# Patient Record
(demographics unavailable — no encounter records)

---

## 2019-06-29 NOTE — XMS REPORT
Clinical Summary

                             Created on: 2019



Dat Sy

External Reference #: [id number]

: 1953

Sex: Female



Demographics







                          Address                   [address] [place] [address]  [address]

 

                          Home Phone                [phone]

 

                          Preferred Language        English

 

                          Marital Status            Unknown

 

                          Alevism Affiliation     Roman Catholic

 

                          Race                      White

 

                          Ethnic Group              Non-





Author







                          Author                    [facility]

 

                          Organization              [facility]

 

                          Address                   Unknown

 

                          Phone                     Unavailable







Support







                Name            Relationship    Address         Phone

 

                    Dat Sy    [employer]                [address] [place]  [address]                 [phone]







Care Team Providers







                    Care Team Member Name    Role                Phone

 

                    Elan Patel     PCP                 Unavailable







Allergies







                                        Comments



                 Active Allergy     Reactions       Severity        Noted Date 

 

                                         



                 Sulfamethoxazole-Trimetho     Itching         High            2015 



                                         prim    

 

                                         



                 Budesonide-Formoterol     Nausea Only     High            2015 







Medications







                          End Date                  Status



              Medication     Sig          Dispensed     Refills      Start  



                                         Date  

 

                                                    Active



                     atorvastatin (LIPITOR) 80     Take 40 mg by       0   



                           MG tablet                 mouth daily .     

 

                                                    Active



                     pantoprazole (PROTONIX)     Take 40 mg by       0   



                           40 MG tablet              mouth daily.     

 

                                                    Active



                     phenytoin (DILANTIN) 100     Take 100 mg         0   



                           MG ER capsuleIndications:     by mouth as     



                           per pt and ,       directed 100     



                                         mg every     



                                         night; On     



                                         Tuesday,     



                                         Thursday,     



                                         Saturday,     



                                          100 mg     



                                         in the day     



                                         time added.     

 

                                                    Active



                     solifenacin (VESICARE) 5     Take 5 mg by        0   



                           MG tablet                 mouth daily.     

 

                                                    Active



                     tiotropium (SPIRIVA) 18     Inhale 18 mcg       0   



                           mcg inhalation capsule     by mouth via     



                                         inhaler     



                                         daily.     

 

                                                    Active



                     albuterol HFA (VENTOLIN     Inhale 1 puff       0   



                           HFA) 90 mcg/actuation     by mouth via     



                           inhaler                   inhaler every     



                                         6 (six) hours     



                                         as needed for     



                                         Wheezing.     

 

                                                    Active



                     zonisamide (ZONEGRAN) 100     Take 100 mg         0   



                           MG capsule                by mouth 2     



                                         (two) times     



                                         daily .     

 

                                                    Active



                     pregabalin (LYRICA) 50 MG     Take 50 mg by       0   



                           capsule                   mouth 2 (two)     



                                         times daily .     

 

                                                    Active



                     ranitidine (ZANTAC) 150     Take 150 mg         0   



                           MG tablet                 by mouth 2     



                                         (two) times     



                                         daily.     

 

                                                    Active



                     calcium citrate-vitamin     Take 600 mg         0   



                           D3 250 mg calcium- 200     by mouth.     



                                         unit Tab      

 

                                                    Active



                 DULERA 200-5     2 puffs 2       0               02/18/201  



                     mcg/actuation inhaler     (two) times         9  



                                         daily .     

 

                                                    Active



                     fluticasone (FLONASE) 50       0                     



                           mcg/actuation nasal spray        8  

 

                                                    Active



                 clobetasol (TEMOVATE)     AP AA ON        0                 



                     0.05 % cream        RIGHT ARM QD        8  



                                         FOR 2 WEEKS     



                                         CYCLES     

 

                          2020                Active



              furosemide (LASIX) 20 MG     Take 1 tablet     30 tablet     0              



                     tablet              (20 mg total)       9  



                                         by mouth     



                                         daily.     

 

                          2020                Active



              metoprolol (LOPRESSOR)     Take 1 tablet     60 tablet     0              



                     100 MG tablet       (100 mg             9  



                                         total) by     



                                         mouth 2 (two)     



                                         times daily.     

 

                                                    Active



                     oxyCODONE-acetaminophen     Take 1 tablet       0   



                           (PERCOCET)  mg per     by mouth     



                           tablet                    every 6 (six)     



                                         hours as     



                                         needed for     



                                         Pain.     

 

                          2019                Active



              methocarbamol (ROBAXIN)     Take 1 tablet     30 tablet     0              



                     500 MG tablet       (500 mg             9  



                                         total) by     



                                         mouth 3     



                                         (three) times     



                                         daily as     



                                         needed     



                                         (muscle     



                                         spasms) for     



                                         up to 10     



                                         days.     

 

                          2019                Discontinued



                     aspirin 81 MG EC tablet     Take 81 mg by       0   



                                         mouth daily.     

 

                          2019                Discontinued



                     clopidogrel (PLAVIX) 75     Take 75 mg by       0   



                           mg tablet                 mouth daily.     

 

                          2019                Discontinued



                     diazepam (VALIUM) 10 MG     Take 10 mg by       0   



                           tablet                    mouth every     



                                         12 (twelve)     



                                         hours as     



                                         needed for     



                                         Anxiety.     

 

                          06/10/2019                Discontinued



                     HYDROcodone-acetaminophen     Take 1 tablet       0   



                           (NORCO 7.5-325) 7.5-325     by mouth     



                           mg per tablet             every 6 (six)     



                                         hours as     



                                         needed for     



                                         Pain.     

 

                          2019                Discontinued



                     carvedilol (COREG) 6.25     Take 6.25 mg        0   



                           MG tablet                 by mouth 2     



                                         (two) times     



                                         daily with     



                                         breakfast and     



                                         dinner.     

 

                          2019                Discontinued



                     mometasone-formoterol     Inhale 2            0   



                           (DULERA) 100-5            puffs by     



                           mcg/actuation inhaler     mouth via     



                                         inhaler 2     



                                         (two) times     



                                         daily.     

 

                          06/10/2019                Discontinued



                     LORazepam (ATIVAN) 0.5 MG     Take 0.5 mg         0   



                           tablet                    by mouth     



                                         every 6 (six)     



                                         hours as     



                                         needed for     



                                         Anxiety.     

 

                          2019                Discontinued



                     LORazepam (ATIVAN) 0.5 MG       0                     



                           tablet                    8  

 

                          2019                Discontinued



                     PROTONIX 40 mg tablet       0                     



                                         9  

 

                          2019                Discontinued



                     ZANTAC 150 mg tablet       0                     



                                         8  

 

                          2019                Discontinued



                     solifenacin (VESICARE) 5       0                     



                           MG tablet                 9  

 

                          2019                Discontinued



                     SPIRIVA WITH HANDIHALER       0                   02/15/201  



                           18 mcg inhalation capsule        9  

 

                          2019                Discontinued



                     zonisamide (ZONEGRAN) 100       0                     



                           MG capsule                9  

 

                          2019                Discontinued



                     aspirin-calcium carbonate     Take by             0   



                           81 mg-300 mg calcium(777     mouth.     



                                         mg) Tab      

 

                          2019                



              predniSONE (DELTASONE) 20     Take 2       40 tablet     0              



                     MG tablet           tablets (40         9  



                                         mg total) by     



                                         mouth 2 (two)     



                                         times daily     



                                         for 10 days.     

 

                          2019                



              rivaroxaban (XARELTO) 15     Take 1 tablet     38 tablet     0              



                     mg Tab tablet       (15 mg total)       9  



                                         by mouth 2     



                                         (two) times     



                                         daily with     



                                         breakfast and     



                                         dinner for 19     



                                         days.     

 

                          2019                Discontinued



              rivaroxaban (XARELTO) 20     Take 1 tablet     30 tablet     2              



                     mg Tab tablet       (20 mg total)       9  



                                         by mouth     



                                         daily with     



                                         dinner.     

 

                          2019                



              enoxaparin (LOVENOX) 40     Inject 0.4     3 Syringe     0              



                     mg/0.4 mL Syrg      mLs (40 mg          9  



                                         total)     



                                         subcutaneousl     



                                         y daily for 2     



                                         days.     

 

                          2019                



              polyethylene glycol     Take 17 g by     14 each      0              



                     (GLYCOLAX) 17 gram packet     mouth as            9  



                                         needed     



                                         (constipation     



                                         ) for up to 3     



                                         days.     







Active Problems







 



                           Problem                   Noted Date

 

 



                           Lumbar disc disease       2019

 

 



                           Radiculitis, lumbosacral     2019

 

 



                           Chest pain                2019

 

 



                           SOB (shortness of breath)     2019

 

 



                           Atrial flutter/atrial fibrillation with rapid ventricular response     2019



 

 



                           Other pulmonary embolism without acute cor pulmonale     2019

 

 



                           Pulmonary infarct         2019

 

 



                           PAD (peripheral artery disease)     2019

 

 



                           Iatrogenic pneumothorax     2017

 

 



                           Pneumothorax after biopsy     2017

 

 



                           Lung nodule               2017

 

 



                           CAD (coronary artery disease)     2017

 

 



                           GERD (gastroesophageal reflux disease)     2017

 

 



                           Pneumothorax after biopsy     2017

 

 



                                         PADs/p Aortogram:Plaque [employer] stent RCIA (7x37 on 06);LEFT 



                                         Bolus isabelle: Diffuse Disease 2015 

 

 



                                         Claudication of left lower extremity 

 

 



                                         HLD (hyperlipidemia) 

 

 



                                         CVD, Carotid plaque less than 50%, US 2014 

 

 



                                         COPD (chronic obstructive pulmonary disease) 







Resolved Problems







  



                     Problem             Noted Date          Resolved Date

 

  



                     SOB (shortness of breath)     2019

 

  



                     Atrial fibrillation with RVR     2019

 

  



                     Chronic bronchitis     2017

 

  



                     PVD (peripheral vascular disease)     2017







Encounters







                          Care Team                 Description



                     Date                Type                Specialty  

 

                                        



Bernice Olivo                



                           2019                Anesthesia   



                                         Event   

 

                                        



Cash Escobar MD                 LAMINECTOMY,LUMBAR W/DISCECTOMY



                           2019                Surgery   

 

                                                     



                           2019                Travel   

 

                                        



Kiran Harrison MD Wellinghoff, Vanessa Thea, MD Hite, Wayne K., DO Chavarria, Jaime, MD                    Radiculitis, lumbosacral (Primary Dx); 

Flank pain; 

Cholecystitis; 

Essential hypertension; 

Chronic obstructive pulmonary disease, unspecified COPD type (HCC)



                     06/10/2019          Hospital            General Internal Medicine  



                           -                         Encounter   



                                         2019    

 

                                                     



                     06/10/2019          Orders Only         General Internal Medicine  

 

                                                     



                           06/10/2019                Travel   

 

                                                     



                           2019                Travel   

 

                                        



Navi Baez MD Zalavarria, Jonard Herbias, MD          Chest pain, unspecified type (Primary Dx)



                     2019          Emergency           Cardiology  



                                         -    



                                         2019          Orders Only         General Internal Medicine  

 

                                                     



                           2019                Travel   

 

                                        



Alex Chaney Jr., MD Dang, Lazaro De La Rosa MD                      SOB (shortness of breath) (Primary Dx); 

Other acute pulmonary embolism without acute cor pulmonale (HCC); 

Lung nodule; 

Chronic obstructive pulmonary disease with acute exacerbation (HCC); 

Paroxysmal atrial fibrillation (HCC)



                     2019          Hospital            Cardiology  



                           -                         Encounter   



                                         2019          Orders Only         General Internal Medicine  

 

                                                     



                           2019                Travel   



after 2018



Family History







   



                 Medical History     Relation        Name            Comments

 

   



                           Heart disease             Father  

 

   



                           Cancer                    Mother  

 

   



                           Cancer                    Sister  









   



                 Relation        Name            Status          Comments

 

   



                                         Father   

 

   



                                         Mother   

 

   



                                         Sister   







Social History







                                        Date



                 Tobacco Use     Types           Packs/Day       Years Used 

 

                                        Quit: 2014



                     Former Smoker       1                   10 

 

    



                                         Smokeless Tobacco: Former   



                                         User   









                                        Tobacco Cessation: Counseling Given: No











   



                 Alcohol Use     Drinks/Week     oz/Week         Comments

 

   



                                         No   









 



                           Sex Assigned at Birth     Date Recorded

 

 



                                         Not on file 









                                        Industry



                           Job Start Date            Occupation 

 

                                        Not on file



                           Not on file               Not on file 









                                        Travel End



                           Travel History            Travel Start 

 





                                         No recent travel history available.







Last Filed Vital Signs







                                        Time Taken



                           Vital Sign                Reading 

 

                                        2019  9:20 AM CDT



                           Blood Pressure            107/56 

 

                                        2019  9:20 AM CDT



                           Pulse                     80 

 

                                        2019  9:20 AM CDT



                           Temperature               35.6 C (96 F) 

 

                                        2019  9:20 AM CDT



                           Respiratory Rate          18 

 

                                        2019  9:20 AM CDT



                           Oxygen Saturation         94% 

 

                                        2019  2:48 AM CDT



                           Inhaled Oxygen            28% 



                                         Concentration  

 

                                        2019 10:14 PM CDT



                           Weight                    80.6 kg (177 lb 12.8 oz) 

 

                                        2019 10:14 PM CDT



                           Height                    165.1 cm (5' 5") 

 

                                        2019 10:14 PM CDT



                           Body Mass Index           29.59 







Plan of Treatment





Not on file



Procedures







                                        Comments



                 Procedure Name     Priority        Date/Time       Associated Diagnosis 

 

                                         



                           RHYTHM STRIP - SCAN       2019  



                                         2:33 PM CDT  

 

                                         



                           RHYTHM STRIP - SCAN       2019  



                                         8:01 AM CDT  

 

                                         



                           RHYTHM STRIP - SCAN       2019  



                                         11:50 AM CDT  

 

                                        



Results for this procedure are in the results section.



                     CBC W/PLT COUNT & AUTO     Routine             2019  



                           DIFFERENTIAL              3:59 AM CDT  

 

                                        



Results for this procedure are in the results section.



                     CBC W/PLT COUNT & AUTO     Routine             2019  



                           DIFFERENTIAL              3:59 AM CDT  

 

                                        



Results for this procedure are in the results section.



                     BASIC METABOLIC PANEL (7)     Routine             2019  



                                         3:59 AM CDT  

 

                                         



                           TRANSFUSION SERVICE       2019  



                           REPORT - SCAN             6:01 PM CDT  

 

                                        



Results for this procedure are in the results section.



                     CBC W/PLT COUNT & AUTO     Routine             2019  



                           DIFFERENTIAL              6:15 AM CDT  

 

                                        



Results for this procedure are in the results section.



                     CBC W/PLT COUNT & AUTO     Routine             2019  



                           DIFFERENTIAL              6:15 AM CDT  

 

                                        



Results for this procedure are in the results section.



                     BASIC METABOLIC PANEL (7)     Routine             2019  



                                         6:11 AM CDT  

 

                                        



Results for this procedure are in the results section.



                     POCT-GLUCOSE METER     Routine             2019  



                                         10:10 PM CDT  

 

                                        



Results for this procedure are in the results section.



                     FL RAD TECH IN OR 30     Routine             2019  



                           MINUTE INCREMENTS         3:38 PM CDT  

 

                                        



Results for this procedure are in the results section.



                     FL RAD TECH IN OR 30     Routine             2019  



                           MINUTE INCREMENTS         2:50 PM CDT  

 

                                         



                     LAMINECTOMY,LUMBAR      2019          Lumbar disc disease 



                           W/DISCECTOMY              12:30 PM CDT  

 

  



                                         Special



                                         Needs



                                         (NEGAR



                                         FRAME,



                                         MICROSCOPE



                                         )

 

                                         



                           RHYTHM STRIP - SCAN       2019  



                                         10:20 AM CDT  

 

                                        



Results for this procedure are in the results section.



                     ABORH, MANUAL       STAT                2019  



                                         6:29 AM CDT  

 

                                        



Results for this procedure are in the results section.



                     CBC W/PLT COUNT & AUTO     Routine             2019  



                           DIFFERENTIAL              5:39 AM CDT  

 

                                        



Results for this procedure are in the results section.



                     TYPE AND SCREEN,     Routine             2019  



                           AUTOMATED                 5:39 AM CDT  

 

                                        



Results for this procedure are in the results section.



                     PT/APTT             Routine             2019  



                                         5:39 AM CDT  

 

                                        



Results for this procedure are in the results section.



                     PHOSPHORUS          Routine             2019  



                                         5:39 AM CDT  

 

                                        



Results for this procedure are in the results section.



                     MAGNESIUM           Routine             2019  



                                         5:39 AM CDT  

 

                                        



Results for this procedure are in the results section.



                     BASIC METABOLIC PANEL (7)     Routine             2019  



                                         5:39 AM CDT  

 

                                        



Results for this procedure are in the results section.



                     CBC W/PLT COUNT & AUTO     Routine             2019  



                           DIFFERENTIAL              5:39 AM CDT  

 

                                        



Results for this procedure are in the results section.



                     URINALYSIS W/ REFLEX     Routine             2019  



                           URINE CULTURE             11:45 AM CDT  

 

                                        



Results for this procedure are in the results section.



                     PLATELET AGGREGATION:     Routine             2019  



                           DRUG EFFECT               9:47 AM CDT  

 

                                         



                           PERIPHERAL VASCULAR       06/15/2019  



                           REPORT - SCAN             9:20 PM CDT  

 

                                        



Results for this procedure are in the results section.



                     VENOUS DOPPLER LEGS     Routine             2019  



                           BILATERAL                 2:00 PM CDT  

 

                                        



Results for this procedure are in the results section.



                     CBC W/PLT COUNT & AUTO     Routine             2019  



                           DIFFERENTIAL              3:55 AM CDT  

 

                                        



Results for this procedure are in the results section.



                     CBC W/PLT COUNT & AUTO     Routine             2019  



                           DIFFERENTIAL              3:55 AM CDT  

 

                                        



Results for this procedure are in the results section.



                     BASIC METABOLIC PANEL (7)     Routine             2019  



                                         3:54 AM CDT  

 

                                        



Results for this procedure are in the results section.



                     MR SPINE LUMBAR WITHOUT     ASAP                2019  



                           IV CONTRAST               5:04 PM CDT  

 

                                        



Results for this procedure are in the results section.



                     CBC W/PLT COUNT & AUTO     Routine             2019  



                           DIFFERENTIAL              3:37 AM CDT  

 

                                        



Results for this procedure are in the results section.



                     CBC W/PLT COUNT & AUTO     Routine             2019  



                           DIFFERENTIAL              3:37 AM CDT  

 

                                        



Results for this procedure are in the results section.



                     BASIC METABOLIC PANEL (7)     Routine             2019  



                                         3:37 AM CDT  

 

                                        



Results for this procedure are in the results section.



                     BASIC METABOLIC PANEL (7)     Routine             2019  



                                         6:19 AM CDT  

 

                                        



Results for this procedure are in the results section.



                     CBC W/PLT COUNT & AUTO     Routine             2019  



                           DIFFERENTIAL              5:01 AM CDT  

 

                                        



Results for this procedure are in the results section.



                     PT/APTT             Routine             2019  



                                         5:01 AM CDT  

 

                                        



Results for this procedure are in the results section.



                     CBC W/PLT COUNT & AUTO     Routine             2019  



                           DIFFERENTIAL              5:01 AM CDT  

 

                                        



Results for this procedure are in the results section.



                     US ABDOMEN LIMITED     STAT                06/10/2019  



                                         9:20 PM CDT  

 

                                        



Results for this procedure are in the results section.



                     HEPATIC FUNCTION PANEL     Routine             06/10/2019  



                                         9:06 PM CDT  

 

                                        



Results for this procedure are in the results section.



                     BILIRUBIN, ADULT TOTAL     STAT                06/10/2019  



                                         9:06 PM CDT  

 

                                        



Results for this procedure are in the results section.



                     ECG 12-LEAD         Routine             06/10/2019  



                                         6:52 PM CDT  

 

                                         



                     ECG 12-LEAD         Routine             06/10/2019  



                                         6:52 PM CDT  

 

  



                                         Procedure



                                         Note -



                                         Interface,



                                         External



                                         Ris In -



                                         06/10/2019



                                         9:05 PM



                                         CDT



                                         Ventricula



                                         r Rate 96



                                         BPM



                                         Atrial



                                         Rate 227



                                         BPM



                                         QRS



                                         Duration



                                         82 ms



                                         Q-T



                                         Interval



                                         344 ms



                                         QTC



                                         Calculatio



                                         n(Bazett)



                                         434 ms



                                         R Axis 61



                                         degrees



                                         T Axis 68



                                         degrees





                                         Atrial



                                         fibrillati



                                         on



                                         Anterior



                                         infarct ,



                                         age



                                         undetermin



                                         ed



                                         Abnormal



                                         ECG



                                         When



                                         compared



                                         with ECG



                                         of



                                         



                                         9 12:11,



                                         Atrial



                                         fibrillati



                                         on has



                                         replaced



                                         Atrial



                                         flutter



                                         QT has



                                         shortened

 

                                        



Results for this procedure are in the results section.



                     POCT-LACTIC ACID, VENOUS     Routine             06/10/2019  



                                         6:46 PM CDT  

 

                                        



Results for this procedure are in the results section.



                     CT ABDOMEN/PELVIS     STAT                06/10/2019  



                           WITH/WITHOUT IV CONTRAST      6:00 PM CDT  

 

                                        



Results for this procedure are in the results section.



                     CBC W/PLT COUNT & AUTO     STAT                06/10/2019  



                           DIFFERENTIAL              1:52 PM CDT  

 

                                        



Results for this procedure are in the results section.



                     ALKALINE PHOSPHATASE     STAT                06/10/2019  



                                         1:52 PM CDT  

 

                                        



Results for this procedure are in the results section.



                     AST (SGOT)          STAT                06/10/2019  



                                         1:52 PM CDT  

 

                                        



Results for this procedure are in the results section.



                     ALT (SGPT)          STAT                06/10/2019  



                                         1:52 PM CDT  

 

                                        



Results for this procedure are in the results section.



                     URINALYSIS W/ MICROSCOPIC     STAT                06/10/2019  



                                         1:52 PM CDT  

 

                                        



Results for this procedure are in the results section.



                     BASIC METABOLIC PANEL (7)     STAT                06/10/2019  



                                         1:52 PM CDT  

 

                                        



Results for this procedure are in the results section.



                     CBC W/PLT COUNT & AUTO     STAT                06/10/2019  



                           DIFFERENTIAL              1:52 PM CDT  

 

                                         



                           RHYTHM STRIP - SCAN       04/15/2019  



                                         5:24 PM CDT  

 

                                         



                           REPORT OF PROCEDURE -      04/15/2019  



                           ENDOSCOPY SCAN            3:30 PM CDT  

 

                                         



                           RHYTHM STRIP - SCAN       04/15/2019  



                                         12:20 PM CDT  

 

                                        



Results for this procedure are in the results section.



                     CBC W/PLT COUNT & AUTO     Routine             2019  



                           DIFFERENTIAL              4:28 AM CDT  

 

                                        



Results for this procedure are in the results section.



                     CBC W/PLT COUNT & AUTO     Routine             2019  



                           DIFFERENTIAL              4:28 AM CDT  

 

                                        



Results for this procedure are in the results section.



                     MAGNESIUM           Routine             2019  



                                         4:28 AM CDT  

 

                                        



Results for this procedure are in the results section.



                     LIPID PANEL         Routine             2019  



                                         4:28 AM CDT  

 

                                        



Results for this procedure are in the results section.



                     HEPATIC FUNCTION PANEL     Routine             2019  



                                         4:28 AM CDT  

 

                                        



Results for this procedure are in the results section.



                     BASIC METABOLIC PANEL (7)     Routine             2019  



                                         4:28 AM CDT  

 

                                        



Results for this procedure are in the results section.



                     TROPONIN I          Routine             2019  



                                         4:28 AM CDT  

 

                                        



Results for this procedure are in the results section.



                     TROPONIN I          Routine             2019  



                                         9:36 PM CDT  

 

                                        



Results for this procedure are in the results section.



                     ED ECG INTERPRETATION     Routine             2019  



                                         2:07 PM CDT  

 

                                        



Results for this procedure are in the results section.



                     XR CHEST 2 VIEWS     STAT                2019  



                                         1:34 PM CDT  

 

                                        



Results for this procedure are in the results section.



                     D-DIMER             STAT                2019  



                                         1:24 PM CDT  

 

                                        



Results for this procedure are in the results section.



                     CBC W/PLT COUNT & AUTO     STAT                2019  



                           DIFFERENTIAL              1:20 PM CDT  

 

                                        



Results for this procedure are in the results section.



                     RAPID TROPONIN I     STAT                2019  



                                         1:20 PM CDT  

 

                                        



Results for this procedure are in the results section.



                     BASIC METABOLIC PANEL (7)     STAT                2019  



                                         1:20 PM CDT  

 

                                        



Results for this procedure are in the results section.



                     CBC W/PLT COUNT & AUTO     STAT                2019  



                           DIFFERENTIAL              1:20 PM CDT  

 

                                        



Results for this procedure are in the results section.



                     PT/APTT             STAT                2019  



                                         12:42 PM CDT  

 

                                         



                     ECG 12-LEAD         Routine             2019  



                                         12:11 PM CDT  

 

  



                                         Procedure



                                         Note -



                                         Interface,



                                         External



                                         Ris In -



                                         2019



                                         7:52 PM



                                         CDT



                                         Ventricula



                                         r Rate 82



                                         BPM



                                         Atrial



                                         Rate 241



                                         BPM



                                         QRS



                                         Duration



                                         92 ms



                                         Q-T



                                         Interval



                                         420 ms



                                         QTC



                                         Calculatio



                                         n(Bazett)



                                         490 ms



                                         R Axis 34



                                         degrees



                                         T Axis 59



                                         degrees





                                         Atrial



                                         flutter



                                         with



                                         variable



                                         A-V block



                                         Low



                                         voltage



                                         QRS



                                         Prolonged



                                         QT



                                         Abnormal



                                         ECG



                                         When



                                         compared



                                         with ECG



                                         of



                                         



                                         9 09:58,



                                         Atrial



                                         flutter



                                         has



                                         replaced



                                         Atrial



                                         fibrillati



                                         on



                                         Vent. rate



                                         has



                                         decreased



                                         BY  42 BPM

 

                                        



Results for this procedure are in the results section.



                     ECG 12-LEAD         STAT                2019  



                                         12:11 PM CDT  

 

                                         



                           RHYTHM STRIP - SCAN       2019  



                                         9:10 AM CST  

 

                                         



                           REPORT OF PROCEDURE -      2019  



                           ENDOSCOPY SCAN            9:10 AM CST  

 

                                        



Results for this procedure are in the results section.



                     POCT-GLUCOSE METER     Routine             2019  



                                         8:53 AM CST  

 

                                        



Results for this procedure are in the results section.



                     BASIC METABOLIC PANEL (7)     Routine             2019  



                                         5:26 AM CST  

 

                                        



Results for this procedure are in the results section.



                     B-TYPE NATRIURETIC FACTOR     Routine             2019  



                           (BNP)                     5:26 AM CST  

 

                                        



Results for this procedure are in the results section.



                     POCT-GLUCOSE METER     Routine             2019  



                                         8:57 PM CST  

 

                                        



Results for this procedure are in the results section.



                     POCT-GLUCOSE METER     Routine             2019  



                                         4:59 PM CST  

 

                                        



Results for this procedure are in the results section.



                     POCT-GLUCOSE METER     Routine             2019  



                                         12:18 PM CST  

 

                                        



Results for this procedure are in the results section.



                     POCT-GLUCOSE METER     Routine             2019  



                                         8:36 AM CST  

 

                                         



                           ECHOCARDIOGRAM REPORT -      2019  



                           SCAN                      9:20 PM CST  

 

                                        



Results for this procedure are in the results section.



                     POCT-GLUCOSE METER     Routine             2019  



                                         9:09 PM CST  

 

                                        



Results for this procedure are in the results section.



                     POCT-GLUCOSE METER     Routine             2019  



                                         4:48 PM CST  

 

                                        



Results for this procedure are in the results section.



                     2D ECHO W/ DOPPLER     Routine             2019  



                           (CW/PW/COLOR)             1:47 PM CST  

 

                                        



Results for this procedure are in the results section.



                     POCT-GLUCOSE METER     Routine             2019  



                                         7:24 AM CST  

 

                                        



Results for this procedure are in the results section.



                     BASIC METABOLIC PANEL (7)     Routine             2019  



                                         5:10 AM CST  

 

                                        



Results for this procedure are in the results section.



                     CBC (HEMOGRAM ONLY)     Routine             2019  



                                         5:10 AM CST  

 

                                        



Results for this procedure are in the results section.



                     POCT-GLUCOSE METER     Routine             2019  



                                         9:30 PM CST  

 

                                        



Results for this procedure are in the results section.



                     CT CHEST PE TEST DESIGN     STAT                2019  



                                         11:30 AM CST  

 

                                        



Results for this procedure are in the results section.



                     CBC W/PLT COUNT & AUTO     STAT                2019  



                           DIFFERENTIAL              10:20 AM CST  

 

                                        



Results for this procedure are in the results section.



                     PT/APTT             STAT                2019  



                                         10:20 AM CST  

 

                                        



Results for this procedure are in the results section.



                     D-DIMER             STAT                2019  



                                         10:20 AM CST  

 

                                        



Results for this procedure are in the results section.



                     CBC W/PLT COUNT & AUTO     STAT                2019  



                           DIFFERENTIAL              10:20 AM CST  

 

                                        



Results for this procedure are in the results section.



                     RAPID TROPONIN I     STAT                2019  



                                         10:20 AM CST  

 

                                        



Results for this procedure are in the results section.



                     B-TYPE NATRIURETIC FACTOR     STAT                2019  



                           (BNP)                     10:20 AM CST  

 

                                        



Results for this procedure are in the results section.



                     MAGNESIUM           STAT                2019  



                                         10:20 AM CST  

 

                                        



Results for this procedure are in the results section.



                     BASIC METABOLIC PANEL (7)     STAT                2019  



                                         10:20 AM CST  

 

                                         



                     ECG 12-LEAD         Routine             2019  



                                         9:58 AM CST  

 

  



                                         Procedure



                                         Note -



                                         Interface,



                                         External



                                         Ris In -



                                         2019



                                         9:09 PM



                                         CST



                                         Ventricula



                                         r Rate 124



                                         BPM



                                         Atrial



                                         Rate 136



                                         BPM



                                         P-R



                                         Interval



                                         130 ms



                                         QRS



                                         Duration



                                         94 ms



                                         Q-T



                                         Interval



                                         308 ms



                                         QTC



                                         Calculatio



                                         n(Bazett)



                                         442 ms



                                         P Axis 51



                                         degrees



                                         R Axis 46



                                         degrees



                                         T Axis 74



                                         degrees





                                         Sinus



                                         tachycardi



                                         a



                                         Low



                                         voltage



                                         QRS



                                         Borderline



                                         ECG



                                         When



                                         compared



                                         with ECG



                                         of



                                         23-MAY-201



                                         7 19:01,



                                         Premature



                                         supraventr



                                         icular



                                         complexes



                                         are no



                                         longer



                                         Present



                                         Vent. rate



                                         has



                                         increased



                                         BY  55 BPM

 

                                        



Results for this procedure are in the results section.



                     ECG 12-LEAD         STAT                2019  



                                         9:58 AM CST  



after 2018



Results

* RHYTHM STRIP - SCAN (2019  2:33 PM CDT)



Only the most recent of 7 results within the time period is included.





 



                           Narrative                 Performed At

 

 



                                         This result has an attachment that is not available. 





* CBC with platelet count + automated diff (2019  3:59 AM CDT)



Only the most recent of 10 results within the time period is included.





   



                 Component       Value           Ref Range       Performed At

 

   



                 WBC             5.6             3.5 - 10.5 K/L     [facility]

 

   



                 RBC             3.70 (L)        3.93 - 5.22 M/L     [facility]

 

   



                 Hemoglobin      12.2            11.2 - 15.7 GM/DL     [facility]

 

   



                 Hematocrit      38.8            34.1 - 44.9 %     [facility]

 

   



                 MCV             104.9 (H)       79.4 - 94.8 fL     [facility]

 

   



                 MCH             33.0 (H)        25.6 - 32.2 pg     [facility]

 

   



                 MCHC            31.4 (L)        32.2 - 35.5 GM/DL     [facility]

 

   



                 RDW             14.6 (H)        11.7 - 14.4 %     [facility]

 

   



                 Platelets       167             150 - 450 K/CU MM     [facility]

 

   



                 MPV             10.4            9.4 - 12.3 fL     [facility]

 

   



                 nRBC            0               0 - 0 /100 WBC     [facility]

 

   



                 % Neutros       61              %               [facility]

 

   



                 % Lymphs        21              %               [facility]

 

   



                 % Monos         10              %               [facility]

 

   



                 % Eos           7               %               [facility]

 

   



                 % Baso          1               %               [facility]

 

   



                 # Neutros       3.42            1.56 - 6.13 K/L     [facility]

 

   



                 # Lymphs        1.18            1.18 - 3.74 K/L     [facility]

 

   



                 # Monos         0.56 (H)        0.24 - 0.36 K/L     [facility]

 

   



                 # Eos           0.41 (H)        0.04 - 0.36 K/L     [facility]

 

   



                 # Baso          0.04            0.01 - 0.08 K/L     [facility]

 

   



                 Immature        0               0 - 1 %         [facility]



                           Granulocytes-[facility]













                                         Specimen

 





                                         Blood









   



                 Performing Organization     Address         City/State/Zipcode     Phone Number

 

   



                 [facility]     [address] [place] [address] [phone]





                                         MEDICAL CENTER   





* Basic Metabolic Panel (2019  3:59 AM CDT)



Only the most recent of 12 results within the time period is included.





   



                 Component       Value           Ref Range       Performed At

 

   



                 Sodium          143             136 - 145 meq/L     [facility]

 

   



                 Potassium       3.8             3.5 - 5.1 meq/L     [facility]

 

   



                 Chloride        110 (H)         98 - 107 meq/L     [facility]

 

   



                 CO2             25              22 - 29 meq/L     [facility]

 

   



                 BUN             15              7 - 21 mg/dL     [facility]

 

   



                 Creatinine      0.98            0.57 - 1.25 mg/dL     [facility]

 

   



                 Glucose         99              70 - 105 mg/dL     [facility]

 

   



                 Calcium         8.9             8.4 - 10.2 mg/dL     [facility]

 

   



                 EGFR            57Comment: ESTIMATED GFR IS     mL/min/1.73 sq m     [facility]



                           NOT AS ACCURATE AS CREATININE      [facility]



                                         CLEARANCE IN PREDICTING  



                                         GLOMERULAR FILTRATION RATE.  



                                         ESTIMATED GFR IS NOT  



                                         APPLICABLE FOR DIALYSIS  



                                         PATIENTS.  













                                         Specimen

 





                                         Blood









   



                 Performing Organization     Address         City/State/Zipcode     Phone Number

 

   



                 [address] [address]     [phone] [place] [address]   





* TRANSFUSION SERVICE REPORT - SCAN (2019  6:01 PM CDT)





 



                           Narrative                 Performed At

 

 



                                         This result has an attachment that is not available. 





* POC-Glucose meter (2019 10:10 PM CDT)



Only the most recent of 10 results within the time period is included.





   



                 Component       Value           Ref Range       Performed At

 

   



                 POC-Glucose Meter     180 (H)Comment: TESTED AT     70 - 110 mg/dL     [facility]



                           [facility] [address] [place] [address]



                                         [id number]  













                                         Specimen

 





                                         Blood









   



                 Performing Organization     Address         City/[facility] Cent[facility]     Phone Number

 

   



                 [address] [address]     [phone]   





* [place] rad tech in or 30 minute increments (2019  3:38 PM CDT)



Only the most recent of 2 results within the time period is included.









                                         Specimen

 











 



                           Narrative                 Performed At

 

 



                           FINAL REPORT              [facility]



                                         PATIENT ID: [id number] 



                                         Examination: Intraoperative evaluation 



                                         2 fluoroscopic spot views were obtained during the procedure by the 



                                         ordering service. 



                                         Images are nondiagnostic as no radiologist was present at the time of 



                                         imaging. 



                                         Fluoroscopic time was 14.2 seconds. 



                                         Please see the procedure report for details. 



                                         Signed: Arcenio Son MD 



                                         Report Verified Date/Time:2019 03:15:55 



                                         Reading Location: [address] Reading Room 



                                         Electronically signed by: ARCENIO SON M.D. on 2019 03:15 





                                         AM 









                                        Procedure Note

 

                                        



Interface, External Ris In - 2019  3:18 AM CDT



FINAL REPORT

 

PATIENT ID:   [id number]

 

 

Examination: Intraoperative evaluation

 

                                        2 fluoroscopic spot views were obtained during the procedure by the 

ordering service. 

 

Images are nondiagnostic as no radiologist was present at the time of 

imaging. 

 

Fluoroscopic time was 14.2 seconds. 

 

Please see the procedure report for details.

 

Signed: Arcenio Son MD

Report Verified Date/Time:  2019 03:15:55

 

Reading Location: [address] Reading Room

      Electronically signed by: ARCENIO SON M.D. on 2019 03:15 AM

 









   



                 Performing Organization     Address         City/State/Zipcode     Phone Number

 

   



                                         [facility]   





* ABORH, manual (2019  6:29 AM CDT)





   



                 Component       Value           Ref Range       Performed At

 

   



                     ABO Grouping        A                   [facility]

 

   



                     Rh Factor           POS                 [facility]













                                         Specimen

 





                                         Blood









   



                 Performing Organization     Address         City/[facility]/Zipcode     Phone Number

 

   



                 [place] [phone] [place] [address]   





* Type and screen, automated (2019  5:39 AM CDT)





   



                 Component       Value           Ref Range       Performed At

 

   



                     ABO/RH AUTOMATED (BEAKER)     A POSITIVE          [facility]

 

   



                     Ab Scrn             NEGATIVE            [facility]













                                         Specimen

 





                                         Blood









   



                 Performing Organization     Address         City/[facility] [place] [facility] Centercode     Phone Number

 

   



                 [place] [address]     [phone] [place] [address]   





* PT/aPTT (2019  5:39 AM CDT)



Only the most recent of 4 results within the time period is included.





   



                 Component       Value           Ref Range       Performed At

 

   



                 Protime         13.0            11.9 - 14.2 seconds     [facility]

 

   



                 INR             1.0             <=5.9           [facility]

 

   



                 PTT             29.3            22.5 - 36.0 seconds     [facility]













                                         Specimen

 





                                         Blood









 



                           Narrative                 Performed At

 

 



                           Effective 2019: PT Reference Range Change     [facility]



                           New: 11.9-14.2Previous: 11.7-14.7     [facility]



                                         RECOMMENDED COUMADIN/WARFARIN INR THERAPY RANGES 



                                         STANDARD DOSE: 2.0-3.0Includes: PROPHYLAXIS for venous thrombosis, systemic

 



                                         embolization; TREATMENT for venous thrombosis and/or pulmonary embolus. 



                                         HIGH RISK: Target INR is 2.5-3.5 for patients wiht mechanical heart valves. 









   



                 Performing Organization     Address         City/[facility]/Zipcode     Phone Number

 

   



                 [place] [id number] [place], [place] [id number] [phone]





                                         MEDICAL CENTER   





* Phosphorus (2019  5:39 AM CDT)





   



                 Component       Value           Ref Range       Performed At

 

   



                 Phosphorus      4.2             2.3 - 4.7 mg/dL     [facility]













                                         Specimen

 





                                         Blood









   



                 Performing Organization     Address         City/[facility] [place] [facility] Centercode     Phone Number

 

   



                 [address] [address]     [phone]





                                         MEDICAL CENTER   





* Magnesium (2019  5:39 AM CDT)



Only the most recent of 3 results within the time period is included.





   



                 Component       Value           Ref Range       Performed At

 

   



                 Magnesium       2.0             1.6 - 2.6 mg/dL     [facility]













                                         Specimen

 





                                         Blood









   



                 Performing Organization     Address         City/[facility] [place] [facility] Cent[facility]ode     Phone Number

 

   



                 [address] [address]     [phone]





                                         MEDICAL CENTER   





* Urinalysis w/Microscopic + Reflex to Culture (2019 11:45 AM CDT)





   



                 Component       Value           Ref Range       Performed At

 

   



                     Color, UA           Light Yellow        [facility]

 

   



                     Clarity, UA         Clear               [facility]

 

   



                 Specific [place], UA     1.011           1.001 - 1.035     [facility]

 

   



                 pH, UA          6.0             5.0 - 8.0       [facility]

 

   



                 Protein, UA     Negative        Negative        [facility]

 

   



                 Glucose, UA     Negative        Negative        [facility]

 

   



                 Ketones, UA     Negative        Negative        [facility]

 

   



                 Bilirubin, UA     Negative        Negative        [facility]

 

   



                 Blood, UA       Negative        Negative        [facility]

 

   



                 Nitrite, UA     Negative        Negative        [facility]

 

   



                 Leukocytes, UA     Moderate (A)     Negative        [facility]

 

   



                 Urobilinogen, UA     0.2             0.2 - 1.0 mg/dL     [facility]

 

   



                 RBC, UA         <1              /HPF            [facility]

 

   



                 WBC, UA         7               /HPF            [facility]

 

   



                     Mucus               Rare                [facility]

 

   



                 Squjose Castellon, UA     2               /HPF            [facility]

 

   



                           Specimen Source           [facility]













                                         Specimen

 





                                         Urine









   



                 Performing Organization     Address         City/State/Zipcode     Phone Number

 

   



                 [facility]     [address] [place] [address] [phone]





                                         [facility]   





* Platelet Aggregation: Drug Effect (2019  9:47 AM CDT)





   



                 Component       Value           Ref Range       Performed At

 

   



                 Strong ADP      100 (H)         70 - 94 %       [facility]

 

   



                 Weak ADP        100 (H)         60 - 91 %       [facility]

 

   



                 Arachadonic Acid     74              63 - 89 %       [facility]

 

   



                     Interpretation      Normal aggregation results      [facility]



                           with all agonists.        [facility]

 

   



                     Pathologist:        Domingo Allen MD (electronic      [facility]



                           signature)                [facility]

 

   



                 Platelets       177             150 - 450 K/CU MM     [facility]













                                         Specimen

 





                                         Blood









 



                           Narrative                 Performed At

 

 



                           Platelet aggregation results may be falsely low with platelet counts     [facility]



                           <100,000/CU MM.           [facility]









   



                 Performing Organization     Address         City/State/Zipcode     Phone Number

 

   



                 [facility]     [address] [place] [address] [phone]





                                         [facility]   





* PERIPHERAL VASCULAR REPORT - SCAN (06/15/2019  9:20 PM CDT)





 



                           Narrative                 Performed At

 

 



                                         This result has an attachment that is not available. 





* Venous Doppler legs Bilateral (2019  2:00 PM CDT)





   



                 Component       Value           Ref Range       Performed At

 

   



                           Ejection Fraction         [facility]



                                         [facility]













                                         Specimen

 











 



                           Impressions               Performed At

 

 



                           Right Impression          [facility]



                           1. There is no deep venous obstruction in the common femoral, profunda     [facility]

 [facility]



                                         femoral, femoral, popliteal, posterior tibial or peroneal veins. 



                                         2. There is no superficial venous obstruction in the great saphenous vein. 



                                         Left Impression 



                                         1. There is no deep venous obstruction in the common femoral, profunda 



                                         femoral, femoral, popliteal or posterior tibial veins. 



                                         2. The peroneal veins are poorly visualized. 



                                         3. There is no superficial venous obstruction in the great saphenous vein. 



                                         Conclusions 



                                         Summary 



                                         Venous duplex imaging and compression of the bilateral lower extremities 



                                         were performed. The veins were technically difficult to visualize due to 



                                         patient inability to cooperate. The bilateral venous systems were patent 



                                         and compressible with no evidence of thrombus where visualized. The venous 



                                         Doppler waveforms were phasic with respiration. 



                                         Signature 



                                         ---------------------------------------------------------------- 



                                         Electronically signed by Bernice Berger MD(Interpreting 



                                         physician) on 06/15/2019 05:05 PM 



                                         ---------------------------------------------------------------- 



                                         Velocities are measured in cm/s ; Diameters are measured in cm 









 



                           Narrative                 Performed At

 

 



                           PV LAB - Lower Extremities DVT Study     [facility]



                           Demographics              [facility]



                                         Patient Name DAT SY Date of 



                                         Study2019 



                                         [id number] 



                                         Age64 



                                         Visit Number [id number] 



                                         Gender Female 



                                         Accession Number [id number] Date of 



                                         Birth10/ 



                                         [facility] 



                                         [id number] 



                                         Physician 



                                         SonographerMeagan Garcia Interpreting Bernice Berger RN, 



                                         [facility] 



                                         Procedure 



                                         Type of Study: 



                                         Veins: Lower Extremities DVT Study, VENOUS DOPPLER LEG, BILATERAL. 



                                         Indications for Study:Evaluate for thrombus . 



                                         Patient Status:Routine. 



                                         Study Location:Vascular Lab. 



                                         Technical Quality:Adequate visualization. 









                                        Procedure Note

 

                                        



Interface, External Ris In - 06/15/2019  5:06 PM CDT



PV LAB - Lower Extremities DVT Study



 Demographics

 

 Patient Name     DAT SY     Date of Study      2019

 

 MRN              [id number]             Age                65

 

 Visit Number     [id number]           Gender             Female

 

 Accession Number [id number]             Date of Birth      1953

 

 Referring        [facility].        Room Number        1460

 Physician

 

 Sonographer      Meagan Garcia   Interpreting       Bernice Berger RN, RVT              Physician          MD

 

Procedure

Type of Study:

 

 Veins: Lower Extremities DVT Study, VENOUS DOPPLER LEG, BILATERAL.

 

Indications for Study:Evaluate for thrombus .



Patient Status:Routine.

Study Location:Vascular Lab.

Technical Quality:Adequate visualization.



Impressions

Right Impression

                                        1. There is no deep venous obstruction in the common femoral, profunda

femoral, femoral, popliteal, posterior tibial or peroneal veins.

                                        2. There is no superficial venous obstruction in the great saphenous vein.

Left Impression

                                        1. There is no deep venous obstruction in the common femoral, profunda

femoral, femoral, popliteal or posterior tibial veins.

                                        2. The peroneal veins are poorly visualized.

                                        3. There is no superficial venous obstruction in the great saphenous vein.



 Conclusions

 

 Summary

 

 Venous duplex imaging and compression of the bilateral lower extremities

 were performed. The veins were technically difficult to visualize due to

 patient inability to cooperate. The bilateral venous systems were patent

 and compressible with no evidence of thrombus where visualized. The venous

 Doppler waveforms were phasic with respiration.

 

 Signature

 

 ----------------------------------------------------------------

 Electronically signed by Bernice Berger MD(Interpreting

 physician) on 06/15/2019 05:05 PM

 ----------------------------------------------------------------

 

Velocities are measured in cm/s ; Diameters are measured in cm











   



                 Performing Organization     Address         City/State/Zipcode     Phone Number

 

   



                                         [facility]   



                                         [facility]   





* MR spine lumbar without IV contrast (2019  5:04 PM CDT)









                                         Specimen

 











 



                           Narrative                 Performed At

 

 



                           FINAL REPORT              [facility]



                                         PATIENT ID: [id number] 



                                         MR, SPINE, LUMBAR, WITHOUT CONTRAST 



                                         INDICATION: Lumbar radiculopathy, >6wks conservative tx, 



                                         persistent-progressive sx, surgical candidate 



                                         COMPARISON: 2014 



                                         TECHNIQUE: Multiplanar, multisequence MR images of the lumbar spine 



                                         without contrast. 



                                         FINDINGS: 



                                         5 nonrib-bearing lumbar-type vertebral bodies are present. 



                                         Alignment of the lumbar spine is within normal limits. 



                                         Vertebral body height is maintained. 



                                         Multilevel disc space height loss is present, most conspicuous at 



                                         L3-L4, L4-L5 and L5-S1 



                                         Conus terminates at L1. 



                                         Cauda equina demonstrates normal appearance. 



                                         No acute findings in the paraspinal soft tissues. 



                                         Evaluation of the individual levels demonstrates: 



                                         L1/L2: Symmetric disc bulge and mild bilateral facet arthropathy and 



                                         hypertrophy. No significant canal or foraminal narrowing. 



                                         L2/L3: Mild disc bulge. No significant spinal canal or foraminal 



                                         narrowing 



                                         L3/L4: Disc bulge with superimposed right paracentral and foraminal 



                                         disc extrusion with cranial migration of disc material, some of which 



                                         extends to the right subarticular recess and neural foramen with 



                                         impingement of the right L4 and L3 nerve roots respectively. Although 



                                         a tinier extrusion was present on prior exam, extrusion has 



                                         significantly increased in size in the interim with further 



                                         impingement of the aforementioned nerve roots. 



                                         L4/L5: Disc bulge and bilateral facet arthropathy and hypertrophy. 



                                         Mild bilateral foraminal narrowing. No significant spinal canal 



                                         narrowing. 



                                         L5/S1: Symmetric disc bulge and bilateral facet arthropathy and 



                                         hypertrophy. No significant canal or foraminal narrowing. 



                                         IMPRESSION: 



                                         Interval increase disc degeneration at L3-L4 with increased size of 



                                         disc extrusion which now extends to the right subarticular recess and 



                                         right neural foramen with impingement of the L4 and L3 nerve roots 



                                         respectively. 



                                         Signed: Graciela Kan MD 



                                         Report Verified Date/Time:2019 18:33:49 



                                         Reading Location: [address] Neuro Reading Room 



                                         Electronically signed by: GRACIELA KAN MD on 2019 06:33 





                                         PM 









                                        Procedure Note

 

                                        



Interface, External Ris In - 2019  6:35 PM CDT



FINAL REPORT

 

PATIENT ID:   [id number]

 

MR, SPINE, LUMBAR, WITHOUT CONTRAST

 

INDICATION: Lumbar radiculopathy, >6wks conservative tx, 

persistent-progressive sx, surgical candidate

 

COMPARISON: 2014

 

TECHNIQUE: Multiplanar, multisequence MR images of the lumbar spine 

without contrast. 

 

FINDINGS:  

                                        5 nonrib-bearing lumbar-type vertebral bodies are present. 

Alignment of the lumbar spine is within normal limits. 

Vertebral body height is maintained. 

Multilevel disc space height loss is present, most conspicuous at 

L3-L4, L4-L5 and L5-S1

Conus terminates at L1.

Cauda equina demonstrates normal appearance.

 

No acute findings in the paraspinal soft tissues.

 

 

Evaluation of the individual levels demonstrates:

 

L1/L2: Symmetric disc bulge and mild bilateral facet arthropathy and 

hypertrophy. No significant canal or foraminal narrowing.

 

L2/L3: Mild disc bulge. No significant spinal canal or foraminal 

narrowing

 

L3/L4: Disc bulge with superimposed right paracentral and foraminal 

disc extrusion with cranial migration of disc material, some of which 

extends to the right subarticular recess and neural foramen with 

impingement of the right L4 and L3 nerve roots respectively. Although 

a tinier extrusion was present on prior exam, extrusion has 

significantly increased in size in the interim with further 

impingement of the aforementioned nerve roots.

 

L4/L5: Disc bulge and bilateral facet arthropathy and hypertrophy. 

Mild bilateral foraminal narrowing. No significant spinal canal 

narrowing.

 

L5/S1: Symmetric disc bulge and bilateral facet arthropathy and 

hypertrophy. No significant canal or foraminal narrowing.

 

IMPRESSION:

 

Interval increase disc degeneration at L3-L4 with increased size of 

disc extrusion which now extends to the right subarticular recess and 

right neural foramen with impingement of the L4 and L3 nerve roots 

respectively.

 

Signed: Graciela Kan MD

Report Verified Date/Time:  2019 18:33:49

 

Reading Location: [facility] C013 Neuro Reading Room

  Electronically signed by: GRACIELA KAN MD on 2019 06:33 PM

 









   



                 Performing Organization     Address         City/State/Zipcode     Phone Number

 

   



                                         [facility]   





* US abdomen limited (06/10/2019  9:20 PM CDT)









                                         Specimen

 











 



                           Narrative                 Performed At

 

 



                           FINAL REPORT              [facility]



                                         PATIENT ID: [id number] 



                                         Abdominal ultrasound dated 6/10/2019 



                                         Comment:Real-time transabdominal ultrasound of the right upper 



                                         quadrant abdomen was performed. 



                                         Liver is normal in size and measures 13.3 cm in length. The 



                                         echogenicity of the liver is normal.No focal lesion is noted in the 



                                         liver. 



                                         Gallbladder is distended. Sludge is present without gallstone seen. 



                                         No biliary dilatation is seen. Common bile duct measures 5 mm in 



                                         diameter.Main portal vein measures 11 mm in diameter. 



                                         Pancreas is visualized and unremarkable. 



                                         Right kidney measures 10.0 x 5.3 x 5.0 cm.Echogenicity of the 



                                         right kidney is normal 



                                         No ascites is present in the abdomen. 



                                         Abdominal aorta is normal in caliber. IVC and Hepatic veins are 



                                         patent. 



                                         Impression: Distended gallbladder with sludge. Otherwise unremarkable 



                                         ultrasound of the right upper quadrant abdomen. 



                                         Signed: Mike Patton MD 



                                         Report Verified Date/Time:06/10/2019 21:41:02 



                                         Reading Location: [facility] B1 C013W Consult Reading Room 



                                         Electronically signed by: MIKE PATTON M.D. on 06/10/2019 09:41 PM

 









                                        Procedure Note

 

                                        



Interface, External Ris In - 06/10/2019  9:43 PM CDT



FINAL REPORT

 

PATIENT ID:   [id number]

 

Abdominal ultrasound dated 6/10/2019

 

Comment:  Real-time transabdominal ultrasound of the right upper 

quadrant abdomen was performed.

 

Liver is normal in size and measures 13.3 cm in length. The 

echogenicity of the liver is normal.  No focal lesion is noted in the 

liver.  

 

Gallbladder is distended. Sludge is present without gallstone seen. 

No biliary dilatation is seen. Common bile duct measures 5 mm in 

diameter.  Main portal vein measures 11 mm in diameter.

 

Pancreas is visualized and unremarkable.

 

Right kidney measures 10.0 x 5.3 x 5.0 cm.    Echogenicity of the 

right kidney is normal

 

No ascites is present in the abdomen.

 

Abdominal aorta is normal in caliber. IVC and Hepatic veins are 

patent.

 

 

Impression: Distended gallbladder with sludge. Otherwise unremarkable 

ultrasound of the right upper quadrant abdomen.

 

Signed: Mike Patton MD

Report Verified Date/Time:  06/10/2019 21:41:02

 

Reading Location: [facility] B1 C013W Consult Reading Room

      Electronically signed by: MIKE PATTON M.D. on 06/10/2019 09:41 PM

 









   



                 Performing Organization     Address         City/State/Zipcode     Phone Number

 

   



                                         [facility]   





* Bilirubin, adult total (06/10/2019  9:06 PM CDT)





   



                 Component       Value           Ref Range       Performed At

 

   



                 Total Bilirubin     0.1 (L)         0.2 - 1.2 mg/dL     [facility]













                                         Specimen

 





                                         Blood









   



                 Performing Organization     Address         City/State/Zipcode     Phone Number

 

   



                 [facility]     [address] [place] [address]     [phone]





                                         MEDICAL CENTER   





* Hepatic function panel (06/10/2019  9:06 PM CDT)



Only the most recent of 2 results within the time period is included.





   



                 Component       Value           Ref Range       Performed At

 

   



                 Protein, Total     4.6 (L)         6.0 - 8.3 gm/dL     [facility]

 

   



                 Albumin         2.7 (L)         3.5 - 5.0 g/dL     [facility]

 

   



                 Total Bilirubin     0.1 (L)         0.2 - 1.2 mg/dL     [facility]

 

   



                 Bilirubin, Direct     0.1             0.1 - 0.5 mg/dL     [facility]

 

   



                 Alkaline Phosphatase     68              40 - 150 U/L     [facility]

 

   



                 AST             14              5 - 34 U/L      [facility]

 

   



                 ALT             13              6 - 55 U/L      [facility]













                                         Specimen

 





                                         Blood









   



                 Performing Organization     Address         City[facility]     Phone Number

 

   



                 [facility]     [address] [place] [address] [phone]





                                         MEDICAL CENTER   





* ECG 12 lead (06/10/2019  6:52 PM CDT)



Only the most recent of 3 results within the time period is included.









                                         Specimen

 











 



                           Narrative                 Performed At

 

 



                           Ventricular Rate 96 BPM     [facility]



                                         Atrial Rate 227 BPM 



                                         QRS Duration 82 ms 



                                         Q-T Interval 344 ms 



                                         QTC Calculation(Bazett) 434 ms 



                                         R Axis 61 degrees 



                                         T Axis 68 degrees 



                                         Atrial fibrillation 



                                         Anterior infarct , age undetermined 



                                         Abnormal ECG 



                                         When compared with ECG of 2019 12:11, 



                                         Atrial fibrillation has replaced Atrial flutter 



                                         Confirmed by MARGARET ERVIN MICHAEL ([id number]) on 2019 7:24:42 AM 









                                        Procedure Note

 

                                        



Interface, External Ris In - 2019  7:24 AM CDT



Ventricular Rate 96 BPM

Atrial Rate 227 BPM

QRS Duration 82 ms

Q-T Interval 344 ms

QTC Calculation(Bazett) 434 ms

R Axis 61 degrees

T Axis 68 degrees



Atrial fibrillation

Anterior infarct , age undetermined

Abnormal ECG

When compared with ECG of 2019 12:11,

Atrial fibrillation has replaced Atrial flutter



Confirmed by MARGARET ERVIN MICHAEL ([id number]) on 2019 7:24:42 AM









   



                 Performing Organization     Address         City/[facility]     Phone Number

 

   



                                         [facility]   





* POC-Lactic Acid, Venous (06/10/2019  6:46 PM CDT)





   



                 Component       Value           Ref Range       Performed At

 

   



                 POC-Lactic Acid, Venous     0.7 (L)Comment: TESTED AT     0.9 - 1.7 mmol/L     [facility] [address] [place] [address]



                                         [id number]  













                                         Specimen

 





                                         Blood









   



                 Performing Organization     Address         City[facility]     Phone Number

 

   



                 [facility]     [address] [place] [address] [phone]





                                         MEDICAL CENTER   





* CT abdomen/pelvis without & with IV contrast (06/10/2019  6:00 PM CDT)









                                         Specimen

 











 



                           Narrative                 Performed At

 

 



                           FINAL REPORT              [facility]



                                         PATIENT ID: [id number] 



                                         TECHNIQUE: CT of the abdomen and pelvis WITHOUT and WITH intravenous 



                                         contrast and WITHOUT oral contrast. Dose modulation, iterative 



                                         reconstruction, and/or weight-based adjustment of the mA/kV was 



                                         utilized to reduce the radiation dose to as low as reasonably 



                                         achievable. 



                                         INDICATION: 65-year-old woman with right flank pain. 



                                         COMPARISON: Abdomen and pelvis CT 2017. 



                                         FINDINGS: 



                                         LOWER THORAX: Dependent atelectasis and nonspecific interlobular 



                                         septal thickening in both lung bases. 



                                         HEPATOBILIARY: No focal hepatic lesions. Distended gallbladder 



                                         measures 4.9 cm in transverse diameter with questionable trace 



                                         pericholecystic fluid. No biliary ductal dilatation. 



                                         SPLEEN: No splenomegaly. 



                                         PANCREAS: No focal masses or ductal dilatation. 



                                         ADRENALS: No adrenal nodules. 



                                         KIDNEYS/URETERS: No hydronephrosis, stones, or solid mass lesions. 



                                         PELVIC ORGANS/BLADDER: Unremarkable. 



                                         PERITONEUM/RETROPERITONEUM: No free air or fluid. 



                                         LYMPH NODES: No lymphadenopathy. 



                                         VESSELS: Atherosclerotic vascular calcifications in the abdominal 



                                         aorta and bilateral iliac arteries without aneurysm. 



                                         GI TRACT: No distention or wall thickening. Normal appendix. 



                                         BONES AND SOFT TISSUES: Osteopenia. Soft tissues are unremarkable. 



                                         IMPRESSION: 



                                         Distended gallbladder with questionable trace pericholecystic fluid; 



                                         acute cholecystitis cannot be excluded. Right upper quadrant 



                                         ultrasound is recommended for further evaluation. 



                                         Signed: Brian Finn MD 



                                         Report Verified Date/Time:06/10/2019 18:30:26 



                                         Reading Location: [facility] B1 C013Y CT Body Reading Room 



                                         Electronically signed by: BRIAN FINN MD on 06/10/2019 06:30 





                                         PM 









                                        Procedure Note

 

                                        



Interface, External Ris In - 06/10/2019  6:32 PM CDT



FINAL REPORT

 

PATIENT ID:   [id number]

 

TECHNIQUE: CT of the abdomen and pelvis WITHOUT and WITH intravenous 

contrast and WITHOUT oral contrast. Dose modulation, iterative 

reconstruction, and/or weight-based adjustment of the mA/kV was 

utilized to reduce the radiation dose to as low as reasonably 

achievable.

 

INDICATION: 65-year-old woman with right flank pain.

 

COMPARISON: Abdomen and pelvis CT 2017.

 

 

FINDINGS:

 

LOWER THORAX: Dependent atelectasis and nonspecific interlobular 

septal thickening in both lung bases.

 

HEPATOBILIARY: No focal hepatic lesions. Distended gallbladder 

measures 4.9 cm in transverse diameter with questionable trace 

pericholecystic fluid. No biliary ductal dilatation.

SPLEEN: No splenomegaly.

PANCREAS: No focal masses or ductal dilatation.

 

ADRENALS: No adrenal nodules.

KIDNEYS/URETERS: No hydronephrosis, stones, or solid mass lesions.

PELVIC ORGANS/BLADDER: Unremarkable.

 

PERITONEUM/RETROPERITONEUM: No free air or fluid.

LYMPH NODES: No lymphadenopathy.

VESSELS: Atherosclerotic vascular calcifications in the abdominal 

aorta and bilateral iliac arteries without aneurysm.

 

GI TRACT: No distention or wall thickening. Normal appendix.

 

BONES AND SOFT TISSUES: Osteopenia. Soft tissues are unremarkable.

 

 

IMPRESSION:

Distended gallbladder with questionable trace pericholecystic fluid; 

acute cholecystitis cannot be excluded. Right upper quadrant 

ultrasound is recommended for further evaluation.

 

Signed: Brian Finn MD

Report Verified Date/Time:  06/10/2019 18:30:26

 

Reading Location: [facility] C013Y CT Body Reading Room

    Electronically signed by: BRIAN FINN MD on 06/10/2019 06:30 PM

 









   



                 Performing Organization     Address         City/State/Zipcode     Phone Number

 

   



                                         [place] [facility]   





* Urinalysis w/Microscopic (06/10/2019  1:52 PM CDT)





   



                 Component       Value           Ref Range       Performed At

 

   



                     Color, UA           Light Yellow        [facility]

 

   



                     Clarity, UA         Hazy                [facility]

 

   



                 Specific [place], UA     1.012           1.001 - 1.035     [facility]

 

   



                 pH, UA          7.5             5.0 - 8.0       [facility]

 

   



                 Protein, UA     Negative        Negative        [facility]

 

   



                 Glucose, UA     Negative        Negative        [facility]

 

   



                 Ketones, UA     Negative        Negative        [facility]

 

   



                 Bilirubin, UA     Negative        Negative        [facility]

 

   



                 Blood, UA       Negative        Negative        [facility]

 

   



                 Nitrite, UA     Negative        Negative        [facility]

 

   



                 Leukocytes, UA     Small (A)       Negative        [facility]

 

   



                 Urobilinogen, UA     0.2             0.2 - 1.0 mg/dL     [facility]

 

   



                 RBC, UA         1               /HPF            [facility]

 

   



                 WBC, UA         4               /HPF            [facility]

 

   



                 Squam Epithel, UA     1               /HPF            [facility]

 

   



                     Crystals, Urine     Rare                [facility]

 

   



                     Yeast               Occasional          [facility]

 

   



                           Specimen Source           [facility]













                                         Specimen

 





                                         Urine









   



                 Performing Organization     Address         City/State/[facility] [place] [facility] Cent[facility]     Phone Number

 

   



                 [address]   





* ALT (SGPT) (06/10/2019  1:52 PM CDT)





   



                 Component       Value           Ref Range       Performed At

 

   



                 ALT             19Comment: Specimen slightly     6 - 55 U/L      [facility]



                           [facility]













                                         Specimen

 





                                         Blood









   



                 Performing Organization     Address         City/[facility]/[place] [facility] Cent[facility]ode     Phone Number

 

   



                 [address]   





* AST (SGOT) (06/10/2019  1:52 PM CDT)





   



                 Component       Value           Ref Range       Performed At

 

   



                 AST             23Comment: Specimen slightly     5 - 34 U/L      [facility]













                                         Specimen

 





                                         Blood









   



                 Performing Organization     Address         City/[facility]/[place] [facility] Centercode     Phone Number

 

   



                 [address]   





* Alkaline phosphatase (06/10/2019  1:52 PM CDT)





   



                 Component       Value           Ref Range       Performed At

 

   



                 Alkaline Phosphatase     105             40 - 150 U/L     [facility]













                                         Specimen

 





                                         Blood









   



                 Performing Organization     Address         City/[facility]/[place] [facility] Cent[facility]ode     Phone Number

 

   



                 [address]   





* EKG-SCANNED (04/15/2019  3:30 PM CDT)



Only the most recent of 2 results within the time period is included.





 



                           Narrative                 Performed At

 

 



                                         This result has an attachment that is not available. 





* Troponin I (2019  4:28 AM CDT)



Only the most recent of 2 results within the time period is included.





   



                 Component       Value           Ref Range       Performed At

 

   



                 Troponin I      <0.01           0.00 - 0.03 ng/mL     [facility]













                                         Specimen

 





                                         Blood









 



                           Narrative                 Performed At

 

 



                           Troponin I (TnI) levels must be interpreted in the context of the presenting     

[facility]



                           symptoms and the clinical findings. Elevated TnI levels indicate myocardial     [facility]



                                         damage, but are not specific for ischemic heart disease. Elevated TnI levels are

 



                                         seen in patients with other cardiac conditions (including myocarditis and 



                                         congestive heart failure), and slight TnI elevations occur in patients with 



                                         other conditions, including sepsis, renal failure, acidosis, acute neurological

 



                                         disease, and persistent tachyarrhythmia. 









   



                 Performing Organization     Address         City/[facility]/[place] [facility] Centercode     Phone Number

 

   



                 [address] [address] [phone]





                                         MEDICAL CENTER   





* Lipid panel (2019  4:28 AM CDT)





   



                 Component       Value           Ref Range       Performed At

 

   



                 Triglycerides     82              mg/dL           [facility]

 

   



                 Cholesterol     155             mg/dL           [facility]

 

   



                 HDL             64              mg/dL           [facility]

 

   



                 LDL Calculated     75              mg/dL           [facility]













                                         Specimen

 





                                         Blood









 



                           Narrative                 Performed At

 

 



                           Triglyceride Reference Range:     [facility]



                            Low Risk <150     [facility]



                                          [id number] 



                                          High Risk 200-499 



                                          Very High Risk>=500 



                                         Cholesterol Reference Range: 



                                          Low Risk <200 



                                          [id number] 



                                          High Risk>240 



                                         HDL Cholesterol Reference Range: 



                                          Low Risk >=60 



                                          High Risk <40 



                                         LDL Cholesterol Reference Range: 



                                          Optimal<100 



                                          Near [id number] 



                                          [id number] 



                                          Gdyr685-768 



                                          Very High >=190 









   



                 Performing Organization     Address         City/[facility]/[place] [facility] Centercode     Phone Number

 

   



                 [facility]     [address] [place] [address] [phone]





                                         MEDICAL CENTER   





* ECG/EKG Interpretation (2019  2:07 PM CDT)





 



                           Narrative                 Performed At

 

 



                                         Navi Baez MD 20192:12 PM 



                                         ECG/EKG Interpretation 



                                         Date/Time: 2019 2:07 PM 



                                         Performed by: Navi Baez MD 



                                         Authorized by: Navi Baez MD 



                                         The ECG was interpreted by ED physician. The ECG is interpreted as atrial 



                                         fibrillation. Rate is normal rate. Heart rate is 82 BPM. 



                                         ST segments normal. T waves normal. 





* XR chest 2 views (2019  1:34 PM CDT)









                                         Specimen

 











 



                           Narrative                 Performed At

 

 



                           FINAL REPORT              [facility]



                                         PATIENT ID: [id number] 



                                         Chest, PA and lateral. 



                                         History: Chest pain. 



                                         Comparison: 2017. 



                                         Discussion: Cardiomegaly and thoracic aortic ectasia. The lungs are 



                                         clear without evidence of consolidation or effusion.There are no 



                                         acute osseous abnormalities. The soft tissues are unremarkable. 



                                         IMPRESSION: 



                                         No acute cardiopulmonary abnormality. 



                                         Signed: Radha Presley MD 



                                         Report Verified Date/Time:2019 13:39:38 



                                         Reading Location: [address] Radiology Reading Room 



                                         Electronically signed by: RADHA PRESLEY M.D. on 2019 01:39 





                                         PM 









                                        Procedure Note

 

                                        



Interface, External Ris In - 2019  1:41 PM CDT



FINAL REPORT

 

PATIENT ID:   [id number]

 

 

Chest, PA and lateral.

 

 

History: Chest pain.

 

Comparison: 2017.

 

Discussion: Cardiomegaly and thoracic aortic ectasia. The lungs are 

clear without evidence of consolidation or effusion.  There are no 

acute osseous abnormalities. The soft tissues are unremarkable.

 

 

IMPRESSION: 

No acute cardiopulmonary abnormality.

 

Signed: Radha Presley MD

Report Verified Date/Time:  2019 13:39:38

 

Reading Location: [address] Radiology Reading Room

  Electronically signed by: RADHA PRESLEY M.D. on 2019 01:39 PM

 









   



                 Performing Organization     Address         City/[facility]/[place] [facility] Centercode     Phone Number

 

   



                                         [facility]   





* D-dimer, quantitative (2019  1:24 PM CDT)



Only the most recent of 2 results within the time period is included.





   



                 Component       Value           Ref Range       Performed At

 

   



                 D-Dimer, Quant     0.34            <0.50 MG/L FEU     [facility]



                                         [facility]



                                         EMERGENCY CENTER,



                                         [facility]













                                         Specimen

 





                                         Blood









 



                           Narrative                 Performed At

 

 



                           REGARDING D-DIMER RESULTS: Results of this D-Dimer test should always be     [facility]



                           interpreted in conjunction with the patient's medical history, clinical     [facility]



                                         presentation and other findings. DVT clinical diagnosis should not be based on 

                                         [place], [facility]



                           the results of [facility] D-Dimer alone.     EMERGENCY CENTER,



                                         [facility]









   



                 Performing Organization     Address         City/[facility]/Zipcode     Phone Number

 

   



                 [facility]     [address] [place] [address]     [phone]



                                         [facility]   



                                         EMERGENCY [place],   



                                         [facility]   





* Rapid Troponin I ([facility] Only) (2019  1:20 PM CDT)



Only the most recent of 2 results within the time period is included.





   



                 Component       Value           Ref Range       Performed At

 

   



                 Rapid Troponin I     <0.05           <0.05 ng/mL     [facility], [facility]



                                         EMERGENCY [place],



                                         [facility]













                                         Specimen

 





                                         Blood









   



                 Performing Organization     Address         City/[facility]/[place] [facility] Centercode     Phone Number

 

   



                 [facility]     [address] [place] [address]     [phone]



                                         [facility], [facility]   



                                         EMERGENCY [place],   



                                         [facility]   





* B-type Natriuretic Factor (BNP) (2019  5:26 AM CST)



Only the most recent of 2 results within the time period is included.





   



                 Component       Value           Ref Range       Performed At

 

   



                 BNP             417 (H)         0 - 100 pg/mL     [facility]













                                         Specimen

 





                                         Blood









   



                 Performing Organization     Address         City/[facility]/[place] [facility] Centercode     Phone Number

 

   



                 [address] [address]     [phone]





                                         MEDICAL CENTER   





* ECHOCARDIOGRAM REPORT - SCAN (2019  9:20 PM CST)





 



                           Narrative                 Performed At

 

 



                                         This result has an attachment that is not available. 





* 2D Echo W/Doppler(CW/PW/Color) (2019  1:47 PM CST)





   



                 Component       Value           Ref Range       Performed At

 

   



                           Ejection Fraction         [facility]



                                         [facility]













                                         Specimen

 











 



                           Narrative                 Performed At

 

 



                           Transthoracic Echocardiography Report (TTE)     [facility]



                           Demographics              [facility]



                                         Patient Name Anika SY of Study 2019 



                                         T 



                                         [id number]

 



                                         Female 



                                         Visit Number 



                                         [id number] 



                                         [id number] Room Number [id number] 



                                         Number 



                                         Date of Birth1953Referring Physician Erin De La Rosa 



                                         Age64 year(s)Sonographer 



                                         Yordan KimtAlex Russell Hamlin, 



                                         Physician

 



                                          MD 



                                         Procedure 



                                         Type of 



                                         Study TTE procedure:2DECHO W DOPPLER(CW/PW/COLOR) (Routine) 



                                         Indications:Palpitations. 



                                         Clinical History 



                                         COPD 



                                         HLD 



                                         HTN 



                                         BRONCHOSCOPY 



                                         Height: 65 inches Weight: 78.47 kg (173 lbs) BSA: 1.86 m^2 BMI: 28.79 kg/m^2 



                                         HR: 75 bpm BP: 103/63 mmHg 



                                         Summary 



                                         The left ventricle is chamber size (by vol index) is normal (female - LVED 



                                         vol - 29-61ml/m2). Normal LV wall thickness. All of the LV segments have 



                                         low normal contractility . Estimated LVEF by qualitative assessment is 



                                         lower limits of normal (50-55%) . 



                                         Mild-to-moderate mitral regurgitation. Suggestive of possible mild 



                                         rheumatic MV disease. Mild mitral stenosis. 



                                         The estimated RA pressure by IVC dynamics 5-10mmHg . 



                                         Mild tricuspid regurgitation. 



                                         Estimated peak systolic PA pressure is 35-40 mmHg . 



                                         Irregular rhythm during the exam. 



                                         LA size is severely enlarged (>48 ml/m2) . 



                                         Signature 



                                         ---------------------------------------------------------------- 



                                         Electronically signed by Christopher Hamlin MD(Interpreting 



                                         physician) on 2019 04:26 PM 



                                         ---------------------------------------------------------------- 



                                         Findings 



                                         Technical Quality: Technically adequate exam. 



                                         Rhythm/BPIrregular rhythm during the exam. 



                                         Left Ventricle The left ventricle is chamber size (by vol 



                                         index) 



                                         is normal (female - LVED vol - 





                                         29-61ml/m2). Normal 



                                         LV wall thickness. All of the LV

 



                                         segments have low 



                                         normal contractility . Estimated

 



                                         LVEF by 



                                         qualitative assessment is lower

 



                                         limits of normal 



                                         (50-55%) . 



                                         Left AtriumLA size is severely enlarged (>48 ml/m2) . 



                                         Right VentricleNormal right ventricle structure and function. 



                                         Right Atrium Normal right atrium. 



                                         Aortic Valve Mild AoV cusp thickening. 



                                         Mild aortic regurgitation. 



                                         Mitral Valve Mild-to-moderate mitral regurgitation. 



                                         Suggestive 



                                         of possible mild rheumatic MV 



                                         disease. Mild mitral 



                                         stenosis. 



                                         Tricuspid ValveMild tricuspid regurgitation. 



                                         Estimated peak systolic PA 



                                         pressure is 35-40 mmHg . 



                                         Pulmonic Valve Normal PV structure and function by limited 



                                         views 



                                         and Doppler. 



                                         AortaAortic root size (SInus of Valsalva 



                                         diameter) is 



                                         normal . 



                                         PericardiumNo evidence of pericardial effusion. 



                                         IVC/SVC/PA/PV/PleuralThe estimated RA pressure by IVC dynamics 5-10mmHg 



                                         . 



                                         Chambers/Structures 



                                         Left Atrium 



                                         LA Dimension: 4.15 cm LA Area: 26.5 cm^2 



                                         LA Volume: 96.94 ml 



                                         LA Vol. Index: 52 ml/m^2 



                                         Left Ventricle 



                                         LVIDd: 4.82 



                                         cmLVEDV:108.53 ml 



                                         LV Septum Diastolic: 0.73 cmLVEF 2D Cube: 47.8 





                                         % 



                                         LV Septum Systolic: 0.76 cm 



                                         LV PW Diastolic: 0.79 cmLV Length: 6.27

 



                                         cm 



                                         LV PW Systolic: 0.43 cm 



                                         LVEDV Andrew's:64.18 mlLVEDVI: 35 



                                         ml/m^2 



                                         LVESV Andrew's:29.3 ml LVESVI: 16 



                                         ml/m^2 



                                         LVEF Andrew's: 54.4 % 



                                         LVOT Diameter: 2.1 cm 



                                         Aorta 



                                         Ao Root S of Cheryl.: 3.12 cmAscending Aorta: 2.76 cm 





                                         Doppler/Quantitative Measurements 



                                         Mitral Valve 



                                         Mean Velocity: 0.75 m/s Deceleration Time: 254.1 msec 



                                         Mean Gradient: 2.86 mmHgArea (continuity): 1.97 cm^2 



                                          MV VTI: 



                                         28.23 cm 



                                         MV Jb. Peak: 1.32 m/s 



                                         Aortic Valve 



                                         Peak Velocity: 0.94 m/s Mean Velocity: 0.66

 



                                         m/s 



                                         Peak Gradient: 3.57 mmHgMean Gradient: 2 



                                         mmHg 



                                         AV Area (continuity): 2.97 cm^2 



                                         AV VTI: 18.78 cm 



                                         AV DVI: 0.86 



                                         LVOT 



                                         Peak Velocity: 0.83 m/s Peak Gradient: 2.73 mmHg 



                                         Mean Velocity: 0.51 m/s Mean Gradient: 1.25 mmHg 



                                         LVOT Diameter: 2.1 cm LVOT VTI: 16.09 cm 



                                         LVOT Area: 3.46 cm^2LVOT SV:55.7 ml 



                                         LVOT CO: 4.18 l/min LVOT CI: 2.25 l/min/m^2 









                                        Procedure Note

 

                                        



Interface, External Ris In - 2019  4:26 PM CST



Transthoracic Echocardiography Report (TTE)



 Demographics

 

 Patient Name   DAT SY  Date of Study       2019

                T

 

 MRN            [id number]        Gender              Female

 

 Visit Number   [id number]      Race                Unknown

 

 Accession      [id number]       Room Number         2438

 Number

 

 Date of Birth  1953      Referring Physician Erin De La Rosa

 

 Age            65 year(s)      Sonographer         Yordan Guerra

 

 Analyst        Amadou Wells       Interpreting        Physician JEWEL Bustos

 

Procedure



 Type of

 Study     TTE procedure:2DECHO W DOPPLER(CW/PW/COLOR) (Routine)

 

Indications:Palpitations.



Clinical History

COPD

HLD

HTN

BRONCHOSCOPY



Height: 65 inches Weight: 78.47 kg (173 lbs) BSA: 1.86 m^2 BMI: 28.79 kg/m^2



HR: 75 bpm BP: 103/63 mmHg



 Summary

 The left ventricle is chamber size (by vol index) is normal (female - LVED

 vol - 29-61ml/m2). Normal LV wall thickness. All of the LV segments have

 low normal contractility . Estimated LVEF by qualitative assessment is

 lower limits of normal (50-55%) .

 Mild-to-moderate mitral regurgitation. Suggestive of possible mild

 rheumatic MV disease. Mild mitral stenosis.

 The estimated RA pressure by IVC dynamics 5-10mmHg .

 Mild tricuspid regurgitation.

 Estimated peak systolic PA pressure is 35-40 mmHg .

 Irregular rhythm during the exam.

 LA size is severely enlarged (>48 ml/m2) .

 

 Signature

 

 ----------------------------------------------------------------

 Electronically signed by Christopher Hamlin MD(Interpreting

 physician) on 2019 04:26 PM

 ----------------------------------------------------------------

 

 Findings

 

Technical Quality: Technically adequate exam.



 Rhythm/BP              Irregular rhythm during the exam.

 

 Left Ventricle         The left ventricle is chamber size (by vol index)

                        is normal (female - LVED vol - 29-61ml/m2). Normal

                        LV wall thickness. All of the LV segments have low

                        normal contractility . Estimated LVEF by

                        qualitative assessment is lower limits of normal

                        (50-55%) .

 

 Left Atrium            LA size is severely enlarged (>48 ml/m2) .

 

 Right Ventricle        Normal right ventricle structure and function.

 

 Right Atrium           Normal right atrium.

 

 Aortic Valve           Mild AoV cusp thickening.

                        Mild aortic regurgitation.

 

 Mitral Valve           Mild-to-moderate mitral regurgitation. Suggestive

                        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 Mild mitral

                        stenosis.

 

 Tricuspid Valve        Mild tricuspid regurgitation.

                        Estimated peak systolic PA pressure is 35-40 mmHg .

 

 Pulmonic Valve         Normal PV structure and function by limited views

                        and Doppler.

 

 Aorta                  Aortic root size (SInus of Valsalva diameter) is

                        normal .

 

 Pericardium            No evidence of pericardial effusion.

 

 IVC/SVC/PA/PV/Pleural  The estimated RA pressure by IVC dynamics 5-10mmHg

                        .

 

Chambers/Structures



 Left Atrium

 

 LA Dimension: 4.15 cm                   LA Area: 26.5 cm^2

 LA Volume: 96.94 ml

 LA Vol. Index: 52 ml/m^2

 

 Left Ventricle

 

 LVIDd: 4.82 cm                              LVEDV:108.53 ml

 LV Septum Diastolic: 0.73 cm                LVEF 2D Cube: 47.8 %

 LV Septum Systolic: 0.76 cm

 LV PW Diastolic: 0.79 cm                    LV Length: 6.27 cm

 LV PW Systolic: 0.43 cm

 LVEDV Andrew's:64.18 ml                    LVEDVI: 35 ml/m^2

 LVESV Andrew's:29.3 ml                     LVESVI: 16 ml/m^2

 LVEF Andrew's: 54.4 %

 

 LVOT Diameter: 2.1 cm

 

Aorta

 

 Ao Root S of Cheryl.: 3.12 cm              Ascending Aorta: 2.76 cm

 

Doppler/Quantitative Measurements



 Mitral Valve

 

 Mean Velocity: 0.75 m/s           Deceleration Time: 254.1 msec

 Mean Gradient: 2.86 mmHg          Area (continuity): 1.97 cm^2

 

                                   MV VTI: 28.23 cm

 

 MV Jb. Peak: 1.32 m/s

 

 Aortic Valve

 

 Peak Velocity: 0.94 m/s                   Mean Velocity: 0.66 m/s

 Peak Gradient: 3.57 mmHg                  Mean Gradient: 2 mmHg

 AV Area (continuity): 2.97 cm^2

 AV VTI: 18.78 cm

 

 AV DVI: 0.86

 

 LVOT

 

 Peak Velocity: 0.83 m/s             Peak Gradient: 2.73 mmHg

 Mean Velocity: 0.51 m/s             Mean Gradient: 1.25 mmHg

 LVOT Diameter: 2.1 cm               LVOT VTI: 16.09 cm

 LVOT Area: 3.46 cm^2                LVOT SV:55.7 ml

 LVOT CO: 4.18 l/min                 LVOT CI: 2.25 l/min/m^2

 









   



                 Performing Organization     Address         City/[facility]     Phone Number

 

   



                                         [facility]   



                                         [facility]   





* CBC (Hemogram only) (2019  5:10 AM CST)





   



                 Component       Value           Ref Range       Performed At

 

   



                 WBC             5.9             3.5 - 10.5 K/L     [facility]

 

   



                 RBC             3.65 (L)        3.93 - 5.22 M/L     [facility]

 

   



                 Hemoglobin      12.3            11.2 - 15.7 GM/DL     [facility]

 

   



                 Hematocrit      37.3            34.1 - 44.9 %     [facility]

 

   



                 MCV             102.2 (H)       79.4 - 94.8 fL     [facility]

 

   



                 MCH             33.7 (H)        25.6 - 32.2 pg     [facility]

 

   



                 MCHC            33.0            32.2 - 35.5 GM/DL     [facility]

 

   



                 RDW             13.9            11.7 - 14.4 %     [facility]

 

   



                 Platelets       147 (L)         150 - 450 K/CU MM     [facility]

 

   



                 MPV             10.0            9.4 - 12.3 fL     [facility]

 

   



                 nRBC            0               0 - 0 /100 WBC     [facility]













                                         Specimen

 





                                         Blood









   



                 Performing Organization     Address         City/[facility]/Zipcode     Phone Number

 

   



                 [facility]     [address] [place] [address] [phone]





                                         MEDICAL CENTER   





* CT chest for pulmonary embolus (2019 11:30 AM CST)









                                         Specimen

 











 



                           Narrative                 Performed At

 

 



                           FINAL REPORT              [employer]



                                         PATIENT ID: [id number] 



                                         Chest CT with contrast, PE protocol 



                                         INDICATION: Shortness of breath 



                                         Chest pain, acute, PE suspected, high pretest prob 



                                         cp/sob 



                                         COMPARISON: 2017 and 2017 



                                         TECHNIQUE: CT examination of the chest was performed after the 



                                         administration of intravenous contrast per pulmonary embolism 



                                         protocol. 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 



                                         were performed. This exam was performed according to our departmental 



                                         dose optimization program which includes automated exposure control, 



                                         adjustment of the mA and/or kV according to patient size and/or use 



                                         of iterative reconstructive technique. 



                                         FINDINGS: 



                                         The contrast bolus was adequate. There are filling defects in the 



                                         right upper lobe pulmonary artery and its branches, consistent with 



                                         pulmonary emboli. There is occlusive segmental and subsegmental in 



                                         the lingular pulmonary artery as well. 



                                         There is no pneumothorax, pulmonary edema or pleural effusion. There 



                                         is a new pulmonary nodule in the superior segment of the left lower 



                                         lobe measuring 1.4 cm, image 21, suspicious for malignancy. There is 



                                         scarring and nodularity in the lingula with volume loss and round 



                                         opacities measuring up to 1.8 cm and 1.9 cm suggestive of infarcts 



                                         with scarring and rounded atelectasis, amenable to follow-up 



                                         examination. There is interval increase in nodular opacity in the 



                                         left lung apex measuring 2.5 cm which can be correlated with history 



                                         of prior biopsy. The major airways are clear. 



                                         The visualized portion of the thyroid gland appear unremarkable. 



                                         There is no hilar or axillary lymphadenopathy. Nonspecific mildly 



                                         prominent mediastinal lymph nodes are again seen measuring up to 1.1 



                                         x 1.4 cm in the pretracheal region. There is no pericardial effusion. 



                                         Limited visualization of the upper abdominal structures appear 



                                         unremarkable. The osseous structures demonstrate degenerative changes. 



                                         IMPRESSION: 



                                         1. Pulmonary emboli in the lingular and right upper lobe pulmonary 



                                         arteries as above. 



                                         2. New 1.4 cm nodule in the superior segment of the left lower lobe, 



                                         suspicious for malignancy. Interval increase in nodular opacity in 



                                         the left lung apex. 



                                         3. Round opacities in the lingula suggestive of pulmonary infarcts 



                                         with scarring and rounded atelectasis, amenable to follow-up 



                                         examination. 



                                         4. Nonspecific mildly prominent mediastinal lymph nodes as before. 



                                         These findings were relayed to Dr. Chaney at 11:30 AM on 2019. 



                                         Signed: Mateo Martinez MD 



                                         Report Verified Date/Time:2019 11:31:48 



                                         Reading Location: [facility] B1 C013X [facility] Consult Reading Room 



                                          Electronically signed by: MATEO MARTINEZ M.D. on 2019 11:31 AM

 









                                        Procedure Note

 

                                        



Interface, External Ris In - 2019 11:34 AM CST



FINAL REPORT

 

PATIENT ID:   [id number]

 

Chest CT with contrast, PE protocol

 

INDICATION: Shortness of breath

Chest pain, acute, PE suspected, high pretest prob

cp/sob

 

COMPARISON: 2017 and 2017

 

TECHNIQUE: CT examination of the chest was performed after the 

administration of intravenous contrast per pulmonary embolism 

protocol. 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 

were performed. This exam was performed according to our departmental 

dose optimization program which includes automated exposure control, 

adjustment of the mA and/or kV according to patient size and/or use 

of iterative reconstructive technique.

 

FINDINGS:

 

The contrast bolus was adequate. There are filling defects in the 

right upper lobe pulmonary artery and its branches, consistent with 

pulmonary emboli. There is occlusive segmental and subsegmental in 

the lingular pulmonary artery as well.

 

There is no pneumothorax, pulmonary edema or pleural effusion. There 

is a new pulmonary nodule in the superior segment of the left lower 

lobe measuring 1.4 cm, image 21, suspicious for malignancy. There is 

scarring and nodularity in the lingula with volume loss and round 

opacities measuring up to 1.8 cm and 1.9 cm suggestive of infarcts 

with scarring and rounded atelectasis, amenable to follow-up 

examination. There is interval increase in nodular opacity in the 

left lung apex measuring 2.5 cm which can be correlated with history 

of prior biopsy. The major airways are clear.

 

The visualized portion of the thyroid gland appear unremarkable. 

There is no hilar or axillary lymphadenopathy. Nonspecific mildly 

prominent mediastinal lymph nodes are again seen measuring up to 1.1 

x 1.4 cm in the pretracheal region. There is no pericardial effusion.

 

Limited visualization of the upper abdominal structures appear 

unremarkable. The osseous structures demonstrate degenerative changes.

 

IMPRESSION:

                                        1. Pulmonary emboli in the lingular and right upper lobe pulmonary 

arteries as above.

                                        2. New 1.4 cm nodule in the superior segment of the left lower lobe, 

suspicious for malignancy. Interval increase in nodular opacity in 

the left lung apex.

                                        3. Round opacities in the lingula suggestive of pulmonary infarcts 

with scarring and rounded atelectasis, amenable to follow-up 

examination.

                                        4. Nonspecific mildly prominent mediastinal lymph nodes as before.

 

These findings were relayed to Dr. Chaney at 11:30 AM on 2019.

 

Signed: Mateo Martinez MD

Report Verified Date/Time:  2019 11:31:48

 

Reading Location: [facility] C013X Ortho Consult Reading Room

       Electronically signed by: MATEO MARTINEZ M.D. on 2019 11:31 AM

 









   



                 Performing Organization     Address         City/State/Zipcode     Phone Number

 

   



                                         [facility]   





after 2018



Insurance







     



            Payer      Benefit     Subscriber ID     Type       Phone      Address



                                         Plan /    



                                         Group    

 

     



                     K[employer]ARE          KEL[employer]YCARE          xxxxxxxxxxx   



                                         MEDICARE    



                                         ADV    

 

     



                 MEDICAID        MEDICAID        xxxxxxxxx       Medicaid  



                                         [place]    









     



            Guarantor Name     Account     Relation to     Date of     Phone      Billing Address



                     Type                Patient             Birth  

 

     



            Dat Sy     Personal/F     Self       1953     [phone]     [address]               ([place])              [place] [address]







Advance Directives





For more information, please contact:



[id number] [place] [address] [phone]









                          Date Inactivated          Comments



                           Code Status               Date Activated  

 

                          2019  1:57 PM         



                           Full Code                 6/10/2019  9:09 PM  









  



                           This code status was determined by:     Patient 









                                                     

 

                          2019 12:12 PM         



                           Full Code                 2019  8:33 PM  









  



                           This code status was determined by:     Patient 









                                                     

 

                          2019 12:43 PM         



                           Full Code                 2019  3:31 PM  









  



                           This code status was determined by:     Patient 









                                                     

 

                          2017  6:33 PM         



                           Full Code                 2017  6:35 PM  









  



                           This code status was determined by:     Patient 









                                                     

 

                          2015  9:18 PM         



                           Full Code                 2015  8:52 AM  









  



                           This code status was determined by:     Patient

## 2019-06-29 NOTE — NUR
pt back to room and given gown and sheet. pt asked to change for eval by md, pt 
states she will do so.

## 2019-06-29 NOTE — NUR
Spouse states she is on blood thinners and was concerned. pt s/p back surgery 
from Wednesday and taking pain medications and states she does not want to take 
any oral stool softners because, "i just dont want too." pt states it was a 
suppository  was trying to place and instead placed in vagina 
accidentally.

## 2019-06-29 NOTE — XMS REPORT
Patient Summary Document

                             Created on: 2019



DAT SY

External Reference #: [id number]

: 1953

Sex: Female



Demographics







                          Address                   [address] [place]  [address]

 

                          Home Phone                [phone]

 

                          Preferred Language        Unknown

 

                          Marital Status            Unknown

 

                          Episcopal Affiliation     Unknown

 

                          Race                      Unknown

 

                          Ethnic Group              Unknown





Author







                          Author                    [facility]

 

                          Address                   Unknown

 

                          Phone                     Unavailable







Care Team Providers







                    Care Team Member Name    Role                Phone

 

                    PARISH JOHNATHAN NEDA    Unavailable         Unavailable

 

                    YONATHAN MCGRATH    Unavailable         Unavailable

 

                    MIKE CHANEY    Unavailable         Unavailable

 

                    MEDARDO WILLINGHAM     Unavailable         Unavailable

 

                    [place], IMAD BASBUBBA    Unavailable         Unavailable

 

                    SYSTEM, NOT IN PROVIDER    Unavailable         Unavailable

 

                    JHAAIRA SU    Unavailable         Unavailable







Problems

This patient has no known problems.



Allergies, Adverse Reactions, Alerts

This patient has no known allergies or adverse reactions.



Medications

This patient has no known medications.



Results







           Test Description    Test Time    Test Comments    Text Results    Atomic Results    Result

 Comments

 

                BASIC METABOLIC PANEL    2019 04:43:00                      

 

   

 

                SODIUM (BEAKER) (test code=381)    143 meq/L       136-145          

 

                POTASSIUM (BEAKER) (test code=379)    3.8 meq/L       3.5-5.1          

 

                CHLORIDE (BEAKER) (test code=382)    110 meq/L                  

 

                CO2 (BEAKER) (test code=355)    25 meq/L        22-29            

 

                BLOOD UREA NITROGEN (BEAKER) (test code=354)    15 mg/dL        7-21             

 

                CREATININE (BEAKER) (test code=358)    0.98 mg/dL      0.57-1.25        

 

                GLUCOSE RANDOM (BEAKER) (test code=652)    99 mg/dL                   

 

                CALCIUM (BEAKER) (test code=697)    8.9 mg/dL       8.4-10.2         

 

                EGFR (BEAKER) (test code=1092)    57 mL/min/1.73 sq m                    ESTIMATED GFR IS NOT AS 

ACCURATE AS CREATININE CLEARANCE IN PREDICTING GLOMERULAR FILTRATION RATE. 
ESTIMATED GFR IS NOT APPLICABLE FOR DIALYSIS PATIENTS.





CBC W/PLT COUNT & AUTO [id number] 04:30:00* 





                Test Item       Value           Reference Range    Comments

 

                WHITE BLOOD CELL COUNT (BEAKER) (test code=775)    5.6 K/ L        3.5-10.5         

 

                RED BLOOD CELL COUNT (BEAKER) (test code=761)    3.70 M/ L       3.93-5.22        

 

                HEMOGLOBIN (BEAKER) (test code=410)    12.2 GM/DL      11.2-15.7        

 

                HEMATOCRIT (BEAKER) (test code=411)    38.8 %          34.1-44.9        

 

                MEAN CORPUSCULAR VOLUME (BEAKER) (test code=753)    104.9 fL        79.4-94.8        

 

                MEAN CORPUSCULAR HEMOGLOBIN (BEAKER) (test code=751)    33.0 pg         25.6-32.2        

 

                    MEAN CORPUSCULAR HEMOGLOBIN CONC (BEAKER) (test code=752)    31.4 GM/DL          32.2-35.5

                                         

 

                RED CELL DISTRIBUTION WIDTH (BEAKER) (test code=412)    14.6 %          11.7-14.4        

 

                PLATELET COUNT (BEAKER) (test code=756)    167 K/CU MM     150-450          

 

                MEAN PLATELET VOLUME (BEAKER) (test code=754)    10.4 fL         9.4-12.3         

 

                NUCLEATED RED BLOOD CELLS (BEAKER) (test code=413)    0 /100 WBC      0-0              

 

                NEUTROPHILS RELATIVE PERCENT (BEAKER) (test code=429)    61 %                             

 

                LYMPHOCYTES RELATIVE PERCENT (BEAKER) (test code=430)    21 %                             

 

                MONOCYTES RELATIVE PERCENT (BEAKER) (test code=431)    10 %                             

 

                EOSINOPHILS RELATIVE PERCENT (BEAKER) (test code=432)    7 %                              

 

                BASOPHILS RELATIVE PERCENT (BEAKER) (test code=437)    1 %                              

 

                NEUTROPHILS ABSOLUTE COUNT (BEAKER) (test code=670)    3.42 K/ L       1.56-6.13        

 

                LYMPHOCYTES ABSOLUTE COUNT (BEAKER) (test code=414)    1.18 K/ L       1.18-3.74        

 

                MONOCYTES ABSOLUTE COUNT (BEAKER) (test code=415)    0.56 K/ L       0.24-0.36        

 

                EOSINOPHILS ABSOLUTE COUNT (BEAKER) (test code=416)    0.41 K/ L       0.04-0.36        

 

                BASOPHILS ABSOLUTE COUNT (BEAKER) (test code=417)    0.04 K/ L       0.01-0.08        

 

                IMMATURE GRANULOCYTES-RELATIVE PERCENT (BEAKER) (test code=2801)    0 %             0-1              





BASIC METABOLIC [id number] 08:47:00* 





                Test Item       Value           Reference Range    Comments

 

                SODIUM (BEAKER) (test code=381)    142 meq/L       136-145          

 

                POTASSIUM (BEAKER) (test code=379)    4.7 meq/L       3.5-5.1          

 

                CHLORIDE (BEAKER) (test code=382)    109 meq/L                  

 

                CO2 (BEAKER) (test code=355)    24 meq/L        22-29            

 

                BLOOD UREA NITROGEN (BEAKER) (test code=354)    14 mg/dL        7-21             

 

                CREATININE (BEAKER) (test code=358)    1.04 mg/dL      0.57-1.25        

 

                GLUCOSE RANDOM (BEAKER) (test code=652)    84 mg/dL                   

 

                CALCIUM (BEAKER) (test code=697)    8.9 mg/dL       8.4-10.2         

 

                EGFR (BEAKER) (test code=1092)    53 mL/min/1.73 sq m                    ESTIMATED GFR IS NOT AS 

ACCURATE AS CREATININE CLEARANCE IN PREDICTING GLOMERULAR FILTRATION RATE. 
ESTIMATED GFR IS NOT APPLICABLE FOR DIALYSIS PATIENTS.





CBC W/PLT COUNT & AUTO [id number] 08:00:00* 





                Test Item       Value           Reference Range    Comments

 

                WHITE BLOOD CELL COUNT (BEAKER) (test code=775)    6.1 K/ L        3.5-10.5         

 

                RED BLOOD CELL COUNT (BEAKER) (test code=761)    3.41 M/ L       3.93-5.22        

 

                HEMOGLOBIN (BEAKER) (test code=410)    11.5 GM/DL      11.2-15.7        

 

                HEMATOCRIT (BEAKER) (test code=411)    36.0 %          34.1-44.9        

 

                MEAN CORPUSCULAR VOLUME (BEAKER) (test code=753)    105.6 fL        79.4-94.8        

 

                MEAN CORPUSCULAR HEMOGLOBIN (BEAKER) (test code=751)    33.7 pg         25.6-32.2        

 

                    MEAN CORPUSCULAR HEMOGLOBIN CONC (BEAKER) (test code=752)    31.9 GM/DL          32.2-35.5

                                         

 

                RED CELL DISTRIBUTION WIDTH (BEAKER) (test code=412)    14.4 %          11.7-14.4        

 

                PLATELET COUNT (BEAKER) (test code=756)    198 K/CU MM     150-450          

 

                MEAN PLATELET VOLUME (BEAKER) (test code=754)    10.0 fL         9.4-12.3         

 

                NUCLEATED RED BLOOD CELLS (BEAKER) (test code=413)    0 /100 WBC      0-0              

 

                NEUTROPHILS RELATIVE PERCENT (BEAKER) (test code=429)    67 %                             

 

                LYMPHOCYTES RELATIVE PERCENT (BEAKER) (test code=430)    20 %                             

 

                MONOCYTES RELATIVE PERCENT (BEAKER) (test code=431)    11 %                             

 

                EOSINOPHILS RELATIVE PERCENT (BEAKER) (test code=432)    2 %                              

 

                BASOPHILS RELATIVE PERCENT (BEAKER) (test code=437)    1 %                              

 

                NEUTROPHILS ABSOLUTE COUNT (BEAKER) (test code=670)    4.08 K/ L       1.56-6.13        

 

                LYMPHOCYTES ABSOLUTE COUNT (BEAKER) (test code=414)    1.21 K/ L       1.18-3.74        

 

                MONOCYTES ABSOLUTE COUNT (BEAKER) (test code=415)    0.68 K/ L       0.24-0.36        

 

                EOSINOPHILS ABSOLUTE COUNT (BEAKER) (test code=416)    0.10 K/ L       0.04-0.36        

 

                BASOPHILS ABSOLUTE COUNT (BEAKER) (test code=417)    0.04 K/ L       0.01-0.08        

 

                IMMATURE GRANULOCYTES-RELATIVE PERCENT (BEAKER) (test code=2801)    0 %             0-1              





FL, RAD TECH IN OR/30 MINUTE [id number] 03:15:00Reason for exam:->
Lamenectomy L3,H2ALMAS REPORT PATIENT ID:   [id number]  Examination: 
Intraoperative evaluation 2 fluoroscopic spot views were obtained during the 
procedure by the ordering service.  Images are nondiagnostic as no radiologist 
was present at the time of imaging.  Fluoroscopic time was 14.2 seconds.  Please
see the procedure report for details. Signed: Arcenio Son MDReport Verified 
Date/Time:  2019 03:15:55 Reading Location: [address] 
Reading Room      Electronically signed by: ARCENIO SON M.D. on 2019 
03:15 AM POCT-GLUCOSE [id number] 22:13:00* 





                Test Item       Value           Reference Range    Comments

 

                POC-GLUCOSE METER (BEAKER) (test code=1538)    180 mg/dL                 TESTED AT [facility] 

[address] [facility] [address]





FL, RAD TECH IN OR/30 MINUTE [id number] 14:49:00Reason for exam:->
Lamenectomy L3,C9IGDVW REPORT PATIENT ID:   [id number] Lumbar spine date 2019
Comment:  Single view of the lumbar spine was submitted for interpretation.A 
metallic localizer is at L3-4 vertebral level.  The findings were given to Dr. Arciniega in the OR at the time of  the dictation. The surgeon agrees with the
finding. Impression: Lumbar Localization. Signed: Mike Patton MDReport Verified 
Date/Time:  2019 14:49:53 Reading Location: [address] Consult Reading 
Room      Electronically signed by: MIKE PATTON M.D. on 2019 02:49 PM 
[id number] 06:28:00* 





                Test Item       Value           Reference Range    Comments

 

                PHOSPHORUS (BEAKER) (test code=604)    4.2 mg/dL       2.3-4.7          





[id number] 06:28:00* 





                Test Item       Value           Reference Range    Comments

 

                MAGNESIUM (BEAKER) (test code=627)    2.0 mg/dL       1.6-2.6          





BASIC METABOLIC [id number] 06:28:00* 





                Test Item       Value           Reference Range    Comments

 

                SODIUM (BEAKER) (test code=381)    141 meq/L       136-145          

 

                POTASSIUM (BEAKER) (test code=379)    3.6 meq/L       3.5-5.1          

 

                CHLORIDE (BEAKER) (test code=382)    110 meq/L                  

 

                CO2 (BEAKER) (test code=355)    24 meq/L        22-29            

 

                BLOOD UREA NITROGEN (BEAKER) (test code=354)    15 mg/dL        7-21             

 

                CREATININE (BEAKER) (test code=358)    1.02 mg/dL      0.57-1.25        

 

                GLUCOSE RANDOM (BEAKER) (test code=652)    101 mg/dL                  

 

                CALCIUM (BEAKER) (test code=697)    8.7 mg/dL       8.4-10.2         

 

                EGFR (BEAKER) (test code=1092)    54 mL/min/1.73 sq m                    ESTIMATED GFR IS NOT AS 

ACCURATE AS CREATININE CLEARANCE IN PREDICTING GLOMERULAR FILTRATION RATE. 
ESTIMATED GFR IS NOT APPLICABLE FOR DIALYSIS PATIENTS.





[id number] 06:08:00* 





                Test Item       Value           Reference Range    Comments

 

                PROTIME (BEAKER) (test code=759)    13.0 seconds    11.9-14.2        

 

                INR (BEAKER) (test code=370)    1.0             <=5.9            

 

                PARTIAL THROMBOPLASTIN TIME (BEAKER) (test code=760)    29.3 seconds    22.5-36.0        







Effective 2019: PT Reference Range ChangeNew: 11.9-14.2  Previous: 11.7-14.
7RECOMMENDED COUMADIN/WARFARIN INR THERAPY RANGESSTANDARD DOSE: 2.0-3.0  Include
s: PROPHYLAXIS for venous thrombosis, systemic embolization; TREATMENT for venou
s thrombosis and/or pulmonary embolus.HIGH RISK: Target INR is 2.5-3.5 for patie
nts wiht mechanical heart valves.CBC W/PLT COUNT & AUTO [id number] 
06:07:00* 





                Test Item       Value           Reference Range    Comments

 

                WHITE BLOOD CELL COUNT (BEAKER) (test code=775)    4.2 K/ L        3.5-10.5         

 

                RED BLOOD CELL COUNT (BEAKER) (test code=761)    3.57 M/ L       3.93-5.22        

 

                HEMOGLOBIN (BEAKER) (test code=410)    11.8 GM/DL      11.2-15.7        

 

                HEMATOCRIT (BEAKER) (test code=411)    37.2 %          34.1-44.9        

 

                MEAN CORPUSCULAR VOLUME (BEAKER) (test code=753)    104.2 fL        79.4-94.8        

 

                MEAN CORPUSCULAR HEMOGLOBIN (BEAKER) (test code=751)    33.1 pg         25.6-32.2        

 

                    MEAN CORPUSCULAR HEMOGLOBIN CONC (BEAKER) (test code=752)    31.7 GM/DL          32.2-35.5

                                         

 

                RED CELL DISTRIBUTION WIDTH (BEAKER) (test code=412)    14.5 %          11.7-14.4        

 

                PLATELET COUNT (BEAKER) (test code=756)    162 K/CU MM     150-450          

 

                MEAN PLATELET VOLUME (BEAKER) (test code=754)    10.0 fL         9.4-12.3         

 

                NUCLEATED RED BLOOD CELLS (BEAKER) (test code=413)    0 /100 WBC      0-0              

 

                NEUTROPHILS RELATIVE PERCENT (BEAKER) (test code=429)    52 %                             

 

                LYMPHOCYTES RELATIVE PERCENT (BEAKER) (test code=430)    25 %                             

 

                MONOCYTES RELATIVE PERCENT (BEAKER) (test code=431)    12 %                             

 

                EOSINOPHILS RELATIVE PERCENT (BEAKER) (test code=432)    9 %                              

 

                BASOPHILS RELATIVE PERCENT (BEAKER) (test code=437)    1 %                              

 

                NEUTROPHILS ABSOLUTE COUNT (BEAKER) (test code=670)    2.18 K/ L       1.56-6.13        

 

                LYMPHOCYTES ABSOLUTE COUNT (BEAKER) (test code=414)    1.04 K/ L       1.18-3.74        

 

                MONOCYTES ABSOLUTE COUNT (BEAKER) (test code=415)    0.50 K/ L       0.24-0.36        

 

                EOSINOPHILS ABSOLUTE COUNT (BEAKER) (test code=416)    0.39 K/ L       0.04-0.36        

 

                BASOPHILS ABSOLUTE COUNT (BEAKER) (test code=417)    0.05 K/ L       0.01-0.08        

 

                IMMATURE GRANULOCYTES-RELATIVE PERCENT (BEAKER) (test code=2801)    0 %             0-1              





URINALYSIS W/ REFLEX URINE [id number] 12:08:00* 





                Test Item       Value           Reference Range    Comments

 

                COLOR (BEAKER) (test code=470)    Light Yellow                     

 

                CLARITY (BEAKER) (test code=469)    Clear                            

 

                SPECIFIC GRAVITY UA (BEAKER) (test code=468)    1.011           1.001-1.035      

 

                PH UA (BEAKER) (test code=467)    6.0             5.0-8.0          

 

                PROTEIN UA (BEAKER) (test code=464)    Negative        Negative         

 

                GLUCOSE UA (BEAKER) (test code=365)    Negative        Negative         

 

                KETONES UA (BEAKER) (test code=371)    Negative        Negative         

 

                BILIRUBIN UA (BEAKER) (test code=462)    Negative        Negative         

 

                BLOOD UA (BEAKER) (test code=461)    Negative        Negative         

 

                NITRITE UA (BEAKER) (test code=465)    Negative        Negative         

 

                LEUKOCYTE ESTERASE UA (BEAKER) (test code=466)    Moderate        Negative         

 

                UROBILINOGEN UA (BEAKER) (test code=463)    0.2 mg/dL       0.2-1.0          

 

                RBC UA (BEAKER) (test code=519)    < /HPF                           

 

                WBC UA (BEAKER) (test code=520)    7 /HPF                           

 

                MUCUS (BEAKER) (test code=1574)    Rare                             

 

                SQUAMOUS EPITHELIAL (BEAKER) (test code=516)    2 /HPF                           

 

                SOURCE(BEAKER) (test code=2795)                                     





PLATELET AGGREGATION: DRUG [id number] 12:03:00* 





                Test Item       Value           Reference Range    Comments

 

                STRONG ADP RESULT(BEAKER) (test code=2137)    100 %           70-94            

 

                WEAK ADP RESULT(BEAKER) (test code=2135)    100 %           60-91            

 

                ARACHADONIC ACID RESULT(BEAKER) (test code=2138)    74 %            63-89            

 

                          PLATELET AGG DRUG INTERPRETATION (BEAKER) (test code=4234)    Normal aggregation results

 with all agonists.                                  

 

                          [facility]-PATHOLOGIST-4965 (BEAKER) (test mhed=6680)    Domnigo Allen MD (electronic signature)

                                                     

 

                PLATELET COUNT AGG (BEAKER) (test code=7771)    177 K/CU MM     150-450          





Platelet aggregation results may be falsely low with platelet counts<100,000/CU 
MM.BASIC METABOLIC [id number] 05:09:00* 





                Test Item       Value           Reference Range    Comments

 

                SODIUM (BEAKER) (test code=381)    141 meq/L       136-145          

 

                POTASSIUM (BEAKER) (test code=379)    3.9 meq/L       3.5-5.1          

 

                CHLORIDE (BEAKER) (test code=382)    109 meq/L                  

 

                CO2 (BEAKER) (test code=355)    24 meq/L        22-29            

 

                BLOOD UREA NITROGEN (BEAKER) (test code=354)    19 mg/dL        7-21             

 

                CREATININE (BEAKER) (test code=358)    0.99 mg/dL      0.57-1.25        

 

                GLUCOSE RANDOM (BEAKER) (test code=652)    87 mg/dL                   

 

                CALCIUM (BEAKER) (test code=697)    9.1 mg/dL       8.4-10.2         

 

                EGFR (BEAKER) (test code=1092)    56 mL/min/1.73 sq m                    ESTIMATED GFR IS NOT AS 

ACCURATE AS CREATININE CLEARANCE IN PREDICTING GLOMERULAR FILTRATION RATE. 
ESTIMATED GFR IS NOT APPLICABLE FOR DIALYSIS PATIENTS.





CBC W/PLT COUNT & AUTO [id number] 04:28:00* 





                Test Item       Value           Reference Range    Comments

 

                WHITE BLOOD CELL COUNT (BEAKER) (test code=775)    5.6 K/ L        3.5-10.5         

 

                RED BLOOD CELL COUNT (BEAKER) (test code=761)    4.05 M/ L       3.93-5.22        

 

                HEMOGLOBIN (BEAKER) (test code=410)    13.4 GM/DL      11.2-15.7        

 

                HEMATOCRIT (BEAKER) (test code=411)    40.4 %          34.1-44.9        

 

                MEAN CORPUSCULAR VOLUME (BEAKER) (test code=753)    99.8 fL         79.4-94.8        

 

                MEAN CORPUSCULAR HEMOGLOBIN (BEAKER) (test code=751)    33.1 pg         25.6-32.2        

 

                    MEAN CORPUSCULAR HEMOGLOBIN CONC (BEAKER) (test code=752)    33.2 GM/DL          32.2-35.5

                                         

 

                RED CELL DISTRIBUTION WIDTH (BEAKER) (test code=412)    13.2 %          11.7-14.4        

 

                PLATELET COUNT (BEAKER) (test code=756)    185 K/CU MM     150-450          

 

                MEAN PLATELET VOLUME (BEAKER) (test code=754)    10.2 fL         9.4-12.3         

 

                NUCLEATED RED BLOOD CELLS (BEAKER) (test code=413)    0 /100 WBC      0-0              

 

                NEUTROPHILS RELATIVE PERCENT (BEAKER) (test code=429)    58 %                             

 

                LYMPHOCYTES RELATIVE PERCENT (BEAKER) (test code=430)    22 %                             

 

                MONOCYTES RELATIVE PERCENT (BEAKER) (test code=431)    11 %                             

 

                EOSINOPHILS RELATIVE PERCENT (BEAKER) (test code=432)    7 %                              

 

                BASOPHILS RELATIVE PERCENT (BEAKER) (test code=437)    1 %                              

 

                NEUTROPHILS ABSOLUTE COUNT (BEAKER) (test code=670)    3.23 K/ L       1.56-6.13        

 

                LYMPHOCYTES ABSOLUTE COUNT (BEAKER) (test code=414)    1.25 K/ L       1.18-3.74        

 

                MONOCYTES ABSOLUTE COUNT (BEAKER) (test code=415)    0.61 K/ L       0.24-0.36        

 

                EOSINOPHILS ABSOLUTE COUNT (BEAKER) (test code=416)    0.39 K/ L       0.04-0.36        

 

                BASOPHILS ABSOLUTE COUNT (BEAKER) (test code=417)    0.08 K/ L       0.01-0.08        

 

                IMMATURE GRANULOCYTES-RELATIVE PERCENT (BEAKER) (test code=2801)    0 %             0-1              





MR, SPINE, LUMBAR, WITHOUT [id number] 18:33:00FINAL REPORT PATIENT ID:  
[id number] MR, SPINE, LUMBAR, WITHOUT CONTRAST INDICATION: Lumbar radiculopathy, >
6wks conservative tx, persistent-progressive sx, surgical candidate COMPARISON: 
2014 TECHNIQUE: Multiplanar, multisequence MR images of the lumbar spi
ne without contrast.  FINDINGS:  5 nonrib-bearing lumbar-type vertebral bodies a
re present. Alignment of the lumbar spine is within normal limits. Vertebral bod
y height is maintained. Multilevel disc space height loss is present, most consp
icuous at L3-L4, L4-L5 and L5-J5Kthnq terminates at L1.Cauda equina demonstrates
normal appearance. No acute findings in the paraspinal soft tissues.  Evaluation
of the individual levels demonstrates: L1/L2: Symmetric disc bulge and mild bi
lateral facet arthropathy and hypertrophy. No significant canal or foraminal rubina
rowing. L2/L3: Mild disc bulge. No significant spinal canal or foraminal narrowi
ng L3/L4: Disc bulge with superimposed right paracentral and foraminal disc extr
usion with cranial migration of disc material, some of which extends to the righ
t subarticular recess and neural foramen with impingement of the right L4 and L3
nerve roots respectively. Although a tinier extrusion was present on prior exam,
extrusion has significantly increased in size in the interim with further impi
ngement of the aforementioned nerve roots. L4/L5: Disc bulge and bilateral facet
arthropathy and hypertrophy. Mild bilateral foraminal narrowing. No significant 
spinal canal narrowing. L5/S1: Symmetric disc bulge and bilateral facet arthrop
athy and hypertrophy. No significant canal or foraminal narrowing. IMPRESSION: I
nterval increase disc degeneration at L3-L4 with increased size of disc extrusio
n which now extends to the right subarticular recess and right neural foramen wi
th impingement of the L4 and L3 nerve roots respectively. Signed: Naheed Kan MDReport Verified Date/Time:  2019 18:33:49 Reading Location: [address] Neuro Reading Room  Electronically signed by: GRACIELA KAN MD on
2019 06:33 PM BASIC METABOLIC [id number] 04:25:00* 





                Test Item       Value           Reference Range    Comments

 

                SODIUM (BEAKER) (test code=381)    138 meq/L       136-145          

 

                POTASSIUM (BEAKER) (test code=379)    4.3 meq/L       3.5-5.1          

 

                CHLORIDE (BEAKER) (test code=382)    111 meq/L                  

 

                CO2 (BEAKER) (test code=355)    22 meq/L        22-29            

 

                BLOOD UREA NITROGEN (BEAKER) (test code=354)    19 mg/dL        7-21             

 

                CREATININE (BEAKER) (test code=358)    0.82 mg/dL      0.57-1.25        

 

                GLUCOSE RANDOM (BEAKER) (test code=652)    85 mg/dL                   

 

                CALCIUM (BEAKER) (test code=697)    8.5 mg/dL       8.4-10.2         

 

                EGFR (BEAKER) (test code=1092)    70 mL/min/1.73 sq m                    ESTIMATED GFR IS NOT AS 

ACCURATE AS CREATININE CLEARANCE IN PREDICTING GLOMERULAR FILTRATION RATE. 
ESTIMATED GFR IS NOT APPLICABLE FOR DIALYSIS PATIENTS.





CBC W/PLT COUNT & AUTO [id number] 04:01:00* 





                Test Item       Value           Reference Range    Comments

 

                WHITE BLOOD CELL COUNT (BEAKER) (test code=775)    5.1 K/ L        3.5-10.5         

 

                RED BLOOD CELL COUNT (BEAKER) (test code=761)    3.77 M/ L       3.93-5.22        

 

                HEMOGLOBIN (BEAKER) (test code=410)    12.5 GM/DL      11.2-15.7        

 

                HEMATOCRIT (BEAKER) (test code=411)    38.3 %          34.1-44.9        

 

                MEAN CORPUSCULAR VOLUME (BEAKER) (test code=753)    101.6 fL        79.4-94.8        

 

                MEAN CORPUSCULAR HEMOGLOBIN (BEAKER) (test code=751)    33.2 pg         25.6-32.2        

 

                    MEAN CORPUSCULAR HEMOGLOBIN CONC (BEAKER) (test code=752)    32.6 GM/DL          32.2-35.5

                                         

 

                RED CELL DISTRIBUTION WIDTH (BEAKER) (test code=412)    13.3 %          11.7-14.4        

 

                PLATELET COUNT (BEAKER) (test code=756)    186 K/CU MM     150-450          

 

                MEAN PLATELET VOLUME (BEAKER) (test code=754)    9.9 fL          9.4-12.3         

 

                NUCLEATED RED BLOOD CELLS (BEAKER) (test code=413)    0 /100 WBC      0-0              

 

                NEUTROPHILS RELATIVE PERCENT (BEAKER) (test code=429)    54 %                             

 

                LYMPHOCYTES RELATIVE PERCENT (BEAKER) (test code=430)    24 %                             

 

                MONOCYTES RELATIVE PERCENT (BEAKER) (test code=431)    14 %                             

 

                EOSINOPHILS RELATIVE PERCENT (BEAKER) (test code=432)    7 %                              

 

                BASOPHILS RELATIVE PERCENT (BEAKER) (test code=437)    2 %                              

 

                NEUTROPHILS ABSOLUTE COUNT (BEAKER) (test code=670)    2.77 K/ L       1.56-6.13        

 

                LYMPHOCYTES ABSOLUTE COUNT (BEAKER) (test code=414)    1.22 K/ L       1.18-3.74        

 

                MONOCYTES ABSOLUTE COUNT (BEAKER) (test code=415)    0.71 K/ L       0.24-0.36        

 

                EOSINOPHILS ABSOLUTE COUNT (BEAKER) (test code=416)    0.34 K/ L       0.04-0.36        

 

                BASOPHILS ABSOLUTE COUNT (BEAKER) (test code=417)    0.08 K/ L       0.01-0.08        

 

                IMMATURE GRANULOCYTES-RELATIVE PERCENT (BEAKER) (test code=2801)    0 %             0-1              





BASIC METABOLIC [id number] 11:37:00* 





                Test Item       Value           Reference Range    Comments

 

                SODIUM (BEAKER) (test code=381)    139 meq/L       136-145          

 

                POTASSIUM (BEAKER) (test code=379)    4.2 meq/L       3.5-5.1          

 

                CHLORIDE (BEAKER) (test code=382)    108 meq/L                  

 

                CO2 (BEAKER) (test code=355)    27 meq/L        22-29            

 

                BLOOD UREA NITROGEN (BEAKER) (test code=354)    24 mg/dL        7-21             

 

                CREATININE (BEAKER) (test code=358)    1.02 mg/dL      0.57-1.25        

 

                GLUCOSE RANDOM (BEAKER) (test code=652)    106 mg/dL                  

 

                CALCIUM (BEAKER) (test code=697)    8.6 mg/dL       8.4-10.2         

 

                EGFR (BEAKER) (test code=1092)    54 mL/min/1.73 sq m                    ESTIMATED GFR IS NOT AS 

ACCURATE AS CREATININE CLEARANCE IN PREDICTING GLOMERULAR FILTRATION RATE. 
ESTIMATED GFR IS NOT APPLICABLE FOR DIALYSIS PATIENTS.





[id number] 05:39:00* 





                Test Item       Value           Reference Range    Comments

 

                PROTIME (BEAKER) (test code=759)    15.0 seconds    11.9-14.2        

 

                INR (BEAKER) (test code=370)    1.2             <=5.9            

 

                PARTIAL THROMBOPLASTIN TIME (BEAKER) (test code=760)    28.6 seconds    22.5-36.0        







Effective 2019: PT Reference Range ChangeNew: 11.9-14.2  Previous: 11.7-14.
7RECOMMENDED COUMADIN/WARFARIN INR THERAPY RANGESSTANDARD DOSE: 2.0-3.0  Include
s: PROPHYLAXIS for venous thrombosis, systemic embolization; TREATMENT for venou
s thrombosis and/or pulmonary embolus.HIGH RISK: Target INR is 2.5-3.5 for patie
nts wiht mechanical heart valves.CBC W/PLT COUNT & AUTO [id number] 
05:31:00* 





                Test Item       Value           Reference Range    Comments

 

                WHITE BLOOD CELL COUNT (BEAKER) (test code=775)    7.3 K/ L        3.5-10.5         

 

                RED BLOOD CELL COUNT (BEAKER) (test code=761)    3.95 M/ L       3.93-5.22        

 

                HEMOGLOBIN (BEAKER) (test code=410)    13.1 GM/DL      11.2-15.7        

 

                HEMATOCRIT (BEAKER) (test code=411)    40.9 %          34.1-44.9        

 

                MEAN CORPUSCULAR VOLUME (BEAKER) (test code=753)    103.5 fL        79.4-94.8        

 

                MEAN CORPUSCULAR HEMOGLOBIN (BEAKER) (test code=751)    33.2 pg         25.6-32.2        

 

                    MEAN CORPUSCULAR HEMOGLOBIN CONC (BEAKER) (test code=752)    32.0 GM/DL          32.2-35.5

                                         

 

                RED CELL DISTRIBUTION WIDTH (BEAKER) (test code=412)    13.7 %          11.7-14.4        

 

                PLATELET COUNT (BEAKER) (test code=756)    215 K/CU MM     150-450          

 

                MEAN PLATELET VOLUME (BEAKER) (test code=754)    10.5 fL         9.4-12.3         

 

                NUCLEATED RED BLOOD CELLS (BEAKER) (test code=413)    0 /100 WBC      0-0              

 

                NEUTROPHILS RELATIVE PERCENT (BEAKER) (test code=429)    75 %                             

 

                LYMPHOCYTES RELATIVE PERCENT (BEAKER) (test code=430)    11 %                             

 

                MONOCYTES RELATIVE PERCENT (BEAKER) (test code=431)    9 %                              

 

                EOSINOPHILS RELATIVE PERCENT (BEAKER) (test code=432)    4 %                              

 

                BASOPHILS RELATIVE PERCENT (BEAKER) (test code=437)    1 %                              

 

                NEUTROPHILS ABSOLUTE COUNT (BEAKER) (test code=670)    5.53 K/ L       1.56-6.13        

 

                LYMPHOCYTES ABSOLUTE COUNT (BEAKER) (test code=414)    0.80 K/ L       1.18-3.74        

 

                MONOCYTES ABSOLUTE COUNT (BEAKER) (test code=415)    0.63 K/ L       0.24-0.36        

 

                EOSINOPHILS ABSOLUTE COUNT (BEAKER) (test code=416)    0.29 K/ L       0.04-0.36        

 

                BASOPHILS ABSOLUTE COUNT (BEAKER) (test code=417)    0.06 K/ L       0.01-0.08        

 

                IMMATURE GRANULOCYTES-RELATIVE PERCENT (BEAKER) (test code=2801)    0 %             0-1              





U/S, ABDOMINAL, LIMITED2019-06-10 21:41:00Abdomen limited area? Add comment if 
clarification is needed.->Gall BladderReason for exam:->BACK PAINFINAL REPORT 
PATIENT ID:   [id number] Abdominal ultrasound dated 6/10/2019 Comment:  Real-time 
transabdominal ultrasound of the right upper quadrant abdomen was performed. 
Liver is normal in size and measures 13.3 cm in length. The echogenicity of the 
liver is normal.  No focal lesion is noted in the liver.   Gallbladder is 
distended. Sludge is present without gallstone seen. No biliary dilatation is 
seen. Common bile duct measures 5 mm in diameter.  Main portal vein measures 11 
mm in diameter. Pancreas is visualized and unremarkable. Right kidney measures 
10.0 x 5.3 x 5.0 cm.    Echogenicity of the right kidney is normal No ascites is
present in the abdomen. Abdominal aorta is normal in caliber. IVC and Hepatic 
veins are patent.  Impression: Distended gallbladder with sludge. Otherwise unre
markable ultrasound of the right upper quadrant abdomen. Signed: Mike Patton
eport Verified Date/Time:  06/10/2019 21:41:02 Reading Location: [address] Reading Room      Electronically signed by: MIKE PATTON M.D. on 06/10/20
19 09:41 PM HEPATIC FUNCTION PANEL2019-06-10 21:34:00* 





                Test Item       Value           Reference Range    Comments

 

                TOTAL PROTEIN (BEAKER) (test code=770)    4.6 gm/dL       6.0-8.3          

 

                ALBUMIN (BEAKER) (test code=1145)    2.7 g/dL        3.5-5.0          

 

                BILIRUBIN TOTAL (BEAKER) (test code=377)    0.1 mg/dL       0.2-1.2          

 

                BILIRUBIN DIRECT (BEAKER) (test code=706)    0.1 mg/dL       0.1-0.5          

 

                ALKALINE PHOSPHATASE (BEAKER) (test code=346)    68 U/L                     

 

                AST (SGOT) (BEAKER) (test code=353)    14 U/L          5-34             

 

                ALT (SGPT) (BEAKER) (test code=347)    13 U/L          6-55             





BILIRUBIN, ADULT TOTAL2019-06-10 21:34:00* 





                Test Item       Value           Reference Range    Comments

 

                BILIRUBIN TOTAL (BEAKER) (test code=377)    0.1 mg/dL       0.2-1.2          





POCT-LACTIC ACID, VENOUS2019-06-10 19:10:00* 





                Test Item       Value           Reference Range    Comments

 

                POC-LACTIC ACID, VENOUS (BEAKER) (test code=2805)    0.7 mmol/L      0.9-1.7         TESTED AT

 [facility] [address] [place] [id number]





CT, ABDOMEN2019-06-10 18:30:00Reason for exam:->abdominal painWhat is the 
patient's sedation requirement?->No SedationFINAL REPORT PATIENT ID:   [id number] 
TECHNIQUE: CT of the abdomen and pelvis WITHOUT and WITH intravenous contrast 
and WITHOUT oral contrast. Dose modulation, iterative reconstruction, and/or 
weight-based adjustment of the mA/kV was utilized to reduce the radiation dose 
to as low as reasonably achievable. INDICATION: 65-year-old woman with right 
flank pain. COMPARISON: Abdomen and pelvis CT 2017.  FINDINGS: LOWER 
THORAX: Dependent atelectasis and nonspecific interlobular septal thickening in 
both lung bases. HEPATOBILIARY: No focal hepatic lesions. Distended gallbladder 
measures 4.9 cm in transverse diameter with questionable trace pericholecystic 
fluid. No biliary ductal dilatation.SPLEEN: No splenomegaly.PANCREAS: No focal 
masses or ductal dilatation. ADRENALS: No adrenal nodules.KIDNEYS/URETERS: No 
hydronephrosis, stones, or solid mass lesions.PELVIC ORGANS/BLADDER: 
Unremarkable. PERITONEUM/RETROPERITONEUM: No free air or fluid.LYMPH NODES: No 
lymphadenopathy.VESSELS: Atherosclerotic vascular calcifications in the abd
ominal aorta and bilateral iliac arteries without aneurysm. GI TRACT: No distent
ion or wall thickening. Normal appendix. BONES AND SOFT TISSUES: Osteopenia. Sof
t tissues are unremarkable.  IMPRESSION:Distended gallbladder with questionable 
trace pericholecystic fluid; acute cholecystitis cannot be excluded. Right upper
quadrant ultrasound is recommended for further evaluation. Signed: Breezy Finn MDReport Verified Date/Time:  06/10/2019 18:30:26 Reading Location: [facility]
013Y CT Body Reading Room    Electronically signed by: BRIAN FINN MD 
on 06/10/2019 06:30 PM ALKALINE PHOSPHATASE2019-06-10 16:13:00* 





                Test Item       Value           Reference Range    Comments

 

                ALKALINE PHOSPHATASE (BEAKER) (test code=346)    105 U/L                    





ALT (SGPT)2019-06-10 16:13:00* 





                Test Item       Value           Reference Range    Comments

 

                ALT (SGPT) (BEAKER) (test code=347)    19 U/L          6-55            Specimen slightly hemolyzed





AST (SGOT)2019-06-10 16:13:00* 





                Test Item       Value           Reference Range    Comments

 

                AST (SGOT) (BEAKER) (test code=353)    23 U/L          5-34            Specimen slightly hemolyzed





BASIC METABOLIC PANEL2019-06-10 16:13:00* 





                Test Item       Value           Reference Range    Comments

 

                SODIUM (BEAKER) (test code=381)    139 meq/L       136-145          

 

                POTASSIUM (BEAKER) (test code=379)    4.6 meq/L       3.5-5.1         Specimen slightly hemolyzed



 

                CHLORIDE (BEAKER) (test code=382)    110 meq/L                  

 

                CO2 (BEAKER) (test code=355)    21 meq/L        22-29            

 

                BLOOD UREA NITROGEN (BEAKER) (test code=354)    22 mg/dL        7-21             

 

                CREATININE (BEAKER) (test code=358)    1.01 mg/dL      0.57-1.25       Specimen slightly hemolyzed



 

                GLUCOSE RANDOM (BEAKER) (test code=652)    150 mg/dL                  

 

                CALCIUM (BEAKER) (test code=697)    9.1 mg/dL       8.4-10.2         

 

                EGFR (BEAKER) (test code=1092)    55 mL/min/1.73 sq m                    ESTIMATED GFR IS NOT AS 

ACCURATE AS CREATININE CLEARANCE IN PREDICTING GLOMERULAR FILTRATION RATE. 
ESTIMATED GFR IS NOT APPLICABLE FOR DIALYSIS PATIENTS.





CBC W/PLT COUNT & AUTO DIFFERENTIAL2019-06-10 15:25:00* 





                Test Item       Value           Reference Range    Comments

 

                WHITE BLOOD CELL COUNT (BEAKER) (test code=775)    4.5 K/ L        3.5-10.5         

 

                RED BLOOD CELL COUNT (BEAKER) (test code=761)    4.38 M/ L       3.93-5.22        

 

                HEMOGLOBIN (BEAKER) (test code=410)    14.4 GM/DL      11.2-15.7        

 

                HEMATOCRIT (BEAKER) (test code=411)    44.8 %          34.1-44.9        

 

                MEAN CORPUSCULAR VOLUME (BEAKER) (test code=753)    102.3 fL        79.4-94.8        

 

                MEAN CORPUSCULAR HEMOGLOBIN (BEAKER) (test code=751)    32.9 pg         25.6-32.2        

 

                    MEAN CORPUSCULAR HEMOGLOBIN CONC (BEAKER) (test code=752)    32.1 GM/DL          32.2-35.5

                                         

 

                RED CELL DISTRIBUTION WIDTH (BEAKER) (test code=412)    13.6 %          11.7-14.4        

 

                PLATELET COUNT (BEAKER) (test code=756)    139 K/CU MM     150-450          

 

                MEAN PLATELET VOLUME (BEAKER) (test code=754)    10.9 fL         9.4-12.3         

 

                NUCLEATED RED BLOOD CELLS (BEAKER) (test code=413)    0 /100 WBC      0-0              

 

                NEUTROPHILS RELATIVE PERCENT (BEAKER) (test code=429)    78 %                             

 

                LYMPHOCYTES RELATIVE PERCENT (BEAKER) (test code=430)    13 %                             

 

                MONOCYTES RELATIVE PERCENT (BEAKER) (test code=431)    6 %                              

 

                EOSINOPHILS RELATIVE PERCENT (BEAKER) (test code=432)    2 %                              

 

                BASOPHILS RELATIVE PERCENT (BEAKER) (test code=437)    1 %                              

 

                NEUTROPHILS ABSOLUTE COUNT (BEAKER) (test code=670)    3.54 K/ L       1.56-6.13        

 

                LYMPHOCYTES ABSOLUTE COUNT (BEAKER) (test code=414)    0.57 K/ L       1.18-3.74        

 

                MONOCYTES ABSOLUTE COUNT (BEAKER) (test code=415)    0.28 K/ L       0.24-0.36        

 

                EOSINOPHILS ABSOLUTE COUNT (BEAKER) (test code=416)    0.08 K/ L       0.04-0.36        

 

                BASOPHILS ABSOLUTE COUNT (BEAKER) (test code=417)    0.06 K/ L       0.01-0.08        

 

                IMMATURE GRANULOCYTES-RELATIVE PERCENT (BEAKER) (test code=2801)    0 %             0-1              





URINALYSIS W/ MICROSCOPIC2019-06-10 14:57:00* 





                Test Item       Value           Reference Range    Comments

 

                COLOR (BEAKER) (test code=470)    Light Yellow                     

 

                CLARITY (BEAKER) (test code=469)    Hazy                             

 

                SPECIFIC GRAVITY UA (BEAKER) (test code=468)    1.012           1.001-1.035      

 

                PH UA (BEAKER) (test code=467)    7.5             5.0-8.0          

 

                PROTEIN UA (BEAKER) (test code=464)    Negative        Negative         

 

                GLUCOSE UA (BEAKER) (test code=365)    Negative        Negative         

 

                KETONES UA (BEAKER) (test code=371)    Negative        Negative         

 

                BILIRUBIN UA (BEAKER) (test code=462)    Negative        Negative         

 

                BLOOD UA (BEAKER) (test code=461)    Negative        Negative         

 

                NITRITE UA (BEAKER) (test code=465)    Negative        Negative         

 

                LEUKOCYTE ESTERASE UA (BEAKER) (test code=466)    Small           Negative         

 

                UROBILINOGEN UA (BEAKER) (test code=463)    0.2 mg/dL       0.2-1.0          

 

                RBC UA (BEAKER) (test code=519)    1 /HPF                           

 

                WBC UA (BEAKER) (test code=520)    4 /HPF                           

 

                SQUAMOUS EPITHELIAL (BEAKER) (test code=516)    1 /HPF                           

 

                CRYSTALS, URINE (BEAKER) (test code=1521)    Rare                             

 

                YEAST (BEAKER) (test code=1585)    Occasional                       

 

                SOURCE(BEAKER) (test code=6129)                                     





[id number] 06:20:00* 





                Test Item       Value           Reference Range    Comments

 

                MAGNESIUM (BEAKER) (test code=627)    2.2 mg/dL       1.6-2.6          





BASIC METABOLIC [id number] 06:20:00* 





                Test Item       Value           Reference Range    Comments

 

                SODIUM (BEAKER) (test code=381)    144 meq/L       136-145          

 

                POTASSIUM (BEAKER) (test code=379)    3.7 meq/L       3.5-5.1          

 

                CHLORIDE (BEAKER) (test code=382)    112 meq/L                  

 

                CO2 (BEAKER) (test code=355)    24 meq/L        22-29            

 

                BLOOD UREA NITROGEN (BEAKER) (test code=354)    22 mg/dL        7-21             

 

                CREATININE (BEAKER) (test code=358)    1.03 mg/dL      0.57-1.25        

 

                GLUCOSE RANDOM (BEAKER) (test code=652)    135 mg/dL                  

 

                CALCIUM (BEAKER) (test code=697)    9.3 mg/dL       8.4-10.2         

 

                EGFR (BEAKER) (test code=1092)    54 mL/min/1.73 sq m                    ESTIMATED GFR IS NOT AS 

ACCURATE AS CREATININE CLEARANCE IN PREDICTING GLOMERULAR FILTRATION RATE. 
ESTIMATED GFR IS NOT APPLICABLE FOR DIALYSIS PATIENTS.





LIPID [id number] 06:20:00* 





                Test Item       Value           Reference Range    Comments

 

                TRIGLYCERIDES (BEAKER) (test code=540)    82 mg/dL                         

 

                CHOLESTEROL (BEAKER) (test code=631)    155 mg/dL                        

 

                HDL CHOLESTEROL (BEAKER) (test code=976)    64 mg/dL                         

 

                LDL CHOLESTEROL CALCULATED (BEAKER) (test code=633)    75 mg/dL                         





Triglyceride Reference Range:   Low Risk         <150   Borderline    150-199   
High Risk     200-499   Very High Risk  >=500Cholesterol Reference Range:   Low 
Risk         <200   Borderline    200-239    High Risk        >240HDL 
Cholesterol Reference Range:   Low Risk         >=60   High Risk         <40LDL 
Cholesterol Reference Range:   Optimal          <100   Near Optimal  100-129   
Borderline    130-159   High          160-189   Very High       >=190   HEPATIC 
FUNCTION [id number] 06:20:00* 





                Test Item       Value           Reference Range    Comments

 

                TOTAL PROTEIN (BEAKER) (test code=770)    5.9 gm/dL       6.0-8.3          

 

                ALBUMIN (BEAKER) (test code=1145)    3.5 g/dL        3.5-5.0          

 

                BILIRUBIN TOTAL (BEAKER) (test code=377)    0.2 mg/dL       0.2-1.2          

 

                BILIRUBIN DIRECT (BEAKER) (test code=706)    0.1 mg/dL       0.1-0.5          

 

                ALKALINE PHOSPHATASE (BEAKER) (test code=346)    72 U/L                     

 

                AST (SGOT) (BEAKER) (test code=353)    15 U/L          5-34             

 

                ALT (SGPT) (BEAKER) (test code=347)    18 U/L          6-55             





TROPONIN -67-85 05:53:00* 





                Test Item       Value           Reference Range    Comments

 

                TROPONIN I (BEAKER) (test code=397)    < ng/mL         0.00-0.03        





Troponin I (TnI) levels must be interpreted in the context of the presenting sym
ptoms and the clinical findings. Elevated TnI levels indicate myocardial damage,
but are not specific for ischemic heart disease. Elevated TnI levels are seen in
patients with other cardiac conditions (including myocarditis and congestive h
eart failure), and slight TnI elevations occur in patients with other conditions
, including sepsis, renal failure, acidosis, acute neurological disease, and per
sistent tachyarrhythmia.CBC W/PLT COUNT & AUTO [id number] 05:03:00* 





                Test Item       Value           Reference Range    Comments

 

                WHITE BLOOD CELL COUNT (BEAKER) (test code=775)    6.9 K/ L        3.5-10.5         

 

                RED BLOOD CELL COUNT (BEAKER) (test code=761)    4.08 M/ L       3.93-5.22        

 

                HEMOGLOBIN (BEAKER) (test code=410)    13.7 GM/DL      11.2-15.7        

 

                HEMATOCRIT (BEAKER) (test code=411)    42.6 %          34.1-44.9        

 

                MEAN CORPUSCULAR VOLUME (BEAKER) (test code=753)    104.4 fL        79.4-94.8        

 

                MEAN CORPUSCULAR HEMOGLOBIN (BEAKER) (test code=751)    33.6 pg         25.6-32.2        

 

                    MEAN CORPUSCULAR HEMOGLOBIN CONC (BEAKER) (test code=752)    32.2 GM/DL          32.2-35.5

                                         

 

                RED CELL DISTRIBUTION WIDTH (BEAKER) (test code=412)    15.3 %          11.7-14.4        

 

                PLATELET COUNT (BEAKER) (test code=756)    217 K/CU MM     150-450          

 

                MEAN PLATELET VOLUME (BEAKER) (test code=754)    10.1 fL         9.4-12.3         

 

                NUCLEATED RED BLOOD CELLS (BEAKER) (test code=413)    0 /100 WBC      0-0              

 

                NEUTROPHILS RELATIVE PERCENT (BEAKER) (test code=429)    57 %                             

 

                LYMPHOCYTES RELATIVE PERCENT (BEAKER) (test code=430)    28 %                             

 

                MONOCYTES RELATIVE PERCENT (BEAKER) (test code=431)    10 %                             

 

                EOSINOPHILS RELATIVE PERCENT (BEAKER) (test code=432)    4 %                              

 

                BASOPHILS RELATIVE PERCENT (BEAKER) (test code=437)    1 %                              

 

                NEUTROPHILS ABSOLUTE COUNT (BEAKER) (test code=670)    3.91 K/ L       1.56-6.13        

 

                LYMPHOCYTES ABSOLUTE COUNT (BEAKER) (test code=414)    1.90 K/ L       1.18-3.74        

 

                MONOCYTES ABSOLUTE COUNT (BEAKER) (test code=415)    0.69 K/ L       0.24-0.36        

 

                EOSINOPHILS ABSOLUTE COUNT (BEAKER) (test code=416)    0.27 K/ L       0.04-0.36        

 

                BASOPHILS ABSOLUTE COUNT (BEAKER) (test code=417)    0.08 K/ L       0.01-0.08        

 

                IMMATURE GRANULOCYTES-RELATIVE PERCENT (BEAKER) (test code=2801)    0 %             0-1              





TROPONIN -04-63 22:14:00* 





                Test Item       Value           Reference Range    Comments

 

                TROPONIN I (BEAKER) (test code=397)    < ng/mL         0.00-0.03        





Troponin I (TnI) levels must be interpreted in the context of the presenting sym
ptoms and the clinical findings. Elevated TnI levels indicate myocardial damage,
but are not specific for ischemic heart disease. Elevated TnI levels are seen in
patients with other cardiac conditions (including myocarditis and congestive h
eart failure), and slight TnI elevations occur in patients with other conditions
, including sepsis, renal failure, acidosis, acute neurological disease, and per
sistent tachyarrhythmia.PT/[id number] 15:16:00* 





                Test Item       Value           Reference Range    Comments

 

                PROTIME (BEAKER) (test code=759)    14.2 seconds    11.7-14.7        

 

                INR (BEAKER) (test code=370)    1.1             <=5.9            

 

                PARTIAL THROMBOPLASTIN TIME (BEAKER) (test code=760)    20.8 seconds    22.5-36.0        







RECOMMENDED COUMADIN/WARFARIN INR THERAPY RANGESSTANDARD DOSE: 2.0 - 3.0   Inclu
linda: PROPHYLAXIS for venous thrombosis, systemic embolization; TREATMENT for francisco
ous thrombosis and/or pulmonary embolus.HIGH RISK: Target INR is 2.5-3.5 for pat
ients with mechanical heart valves.[id number] 13:42:00* 





                Test Item       Value           Reference Range    Comments

 

                D-DIMER QUANTITATIVE (BEAKER) (test code=671)    0.34 MG/L FEU    <0.50            





REGARDING D-DIMER RESULTS: Results of this D-Dimer test should always be interpr
eted in conjunction with the patient's medical history, clinical presentation an
d other findings. DVT clinical diagnosis should not be based on the results of I
NNOVANCE D-Dimer alone.RAPID TROPONIN -84-67 13:41:00* 





                Test Item       Value           Reference Range    Comments

 

                RAPID TROPONIN I (BEAKER) (test code=1483)    < ng/mL         <0.05            





RAD, CHEST, 2 [id number] 13:39:00Reason for exam:->CHEST PAINonset 
yesterdayShould this be performed at the bedside?->NoFINAL REPORT PATIENT ID:   
[id number]  Chest, PA and lateral.  History: Chest pain. Comparison: 2017. 
Discussion: Cardiomegaly and thoracic aortic ectasia. The lungs are clear 
without evidence of consolidation or effusion.  There are no acute osseous 
abnormalities. The soft tissues are unremarkable.  IMPRESSION: No acute 
cardiopulmonary abnormality. Signed: Radha Barreto MDReport Verified Lenny
e/Time:  2019 13:39:38 Reading Location: [address] Radiology Reading R
oom  Electronically signed by: RADHA BARRETO M.D. on 2019 01:39 
PM BASIC METABOLIC [id number] 13:34:00* 





                Test Item       Value           Reference Range    Comments

 

                SODIUM (BEAKER) (test code=381)    138 meq/L       135-148          

 

                POTASSIUM (BEAKER) (test code=379)    4.2 meq/L       3.6-5.5          

 

                CHLORIDE (BEAKER) (test code=382)    105 meq/L                  

 

                CO2 (BEAKER) (test code=355)    26 meq/L        24-32            

 

                BLOOD UREA NITROGEN (BEAKER) (test code=354)    24 mg/dL        10-26            

 

                CREATININE (BEAKER) (test code=358)    1.10 mg/dL      0.50-1.20        

 

                GLUCOSE RANDOM (BEAKER) (test code=652)    104 mg/dL                  

 

                CALCIUM (BEAKER) (test code=697)    9.0 mg/dL       8.5-10.5         

 

                EGFR (BEAKER) (test code=1092)    50 mL/min/1.73 sq m                    ESTIMATED GFR IS NOT AS 

ACCURATE AS CREATININE CLEARANCE IN PREDICTING GLOMERULAR FILTRATION RATE. 
ESTIMATED GFR IS NOT APPLICABLE FOR DIALYSIS PATIENTS.





CBC W/PLT COUNT & AUTO [id number] 13:24:00* 





                Test Item       Value           Reference Range    Comments

 

                WHITE BLOOD CELL COUNT (BEAKER) (test code=775)    9.1 K/ L        4.0-10.0         

 

                RED BLOOD CELL COUNT (BEAKER) (test code=761)    4.29 M/ L       4.00-5.00        

 

                HEMOGLOBIN (BEAKER) (test code=410)    14.7 GM/DL      12.0-15.0        

 

                HEMATOCRIT (BEAKER) (test code=411)    43.7 %          36.0-45.0        

 

                MEAN CORPUSCULAR VOLUME (BEAKER) (test code=753)    101.9 fL        82.0-99.0        

 

                MEAN CORPUSCULAR HEMOGLOBIN (BEAKER) (test code=751)    34.3 pg         27.0-33.0        

 

                    MEAN CORPUSCULAR HEMOGLOBIN CONC (BEAKER) (test code=752)    33.6 GM/DL          32.0-36.0

                                         

 

                RED CELL DISTRIBUTION WIDTH (BEAKER) (test code=412)    13.7 %          10.3-14.2        

 

                PLATELET COUNT (BEAKER) (test code=756)    251 K/CU MM     150-430          

 

                MEAN PLATELET VOLUME (BEAKER) (test code=754)    7.4 fL          6.5-10.5         

 

                NEUTROPHILS RELATIVE PERCENT (BEAKER) (test code=429)    86 %                             

 

                LYMPHOCYTES RELATIVE PERCENT (BEAKER) (test code=430)    8 %                              

 

                MONOCYTES RELATIVE PERCENT (BEAKER) (test code=431)    4 %                              

 

                EOSINOPHILS RELATIVE PERCENT (BEAKER) (test code=432)    2 %                              

 

                BASOPHILS RELATIVE PERCENT (BEAKER) (test code=437)    1 %                              

 

                NEUTROPHILS ABSOLUTE COUNT (BEAKER) (test code=670)    7.78 K/ L       1.80-8.00        

 

                LYMPHOCYTES ABSOLUTE COUNT (BEAKER) (test code=414)    0.74 K/ L       1.48-4.50        

 

                MONOCYTES ABSOLUTE COUNT (BEAKER) (test code=415)    0.34 K/ L       0.00-1.30        

 

                EOSINOPHILS ABSOLUTE COUNT (BEAKER) (test code=416)    0.16 K/ L       0.00-0.50        

 

                BASOPHILS ABSOLUTE COUNT (BEAKER) (test code=417)    0.05 K/ L       0.00-0.20        





POCT-GLUCOSE [id number] 08:56:00* 





                Test Item       Value           Reference Range    Comments

 

                POC-GLUCOSE METER (BEAKER) (test code=1538)    142 mg/dL                 TESTED AT [address] [address]





BASIC METABOLIC [id number] 06:18:00* 





                Test Item       Value           Reference Range    Comments

 

                SODIUM (BEAKER) (test code=381)    140 meq/L       136-145          

 

                POTASSIUM (BEAKER) (test code=379)    3.8 meq/L       3.5-5.1          

 

                CHLORIDE (BEAKER) (test code=382)    110 meq/L                  

 

                CO2 (BEAKER) (test code=355)    22 meq/L        22-29            

 

                BLOOD UREA NITROGEN (BEAKER) (test code=354)    23 mg/dL        7-21             

 

                CREATININE (BEAKER) (test code=358)    1.10 mg/dL      0.57-1.25        

 

                GLUCOSE RANDOM (BEAKER) (test code=652)    83 mg/dL                   

 

                CALCIUM (BEAKER) (test code=697)    9.0 mg/dL       8.4-10.2         

 

                EGFR (BEAKER) (test code=1092)    50 mL/min/1.73 sq m                    ESTIMATED GFR IS NOT AS 

ACCURATE AS CREATININE CLEARANCE IN PREDICTING GLOMERULAR FILTRATION RATE. 
ESTIMATED GFR IS NOT APPLICABLE FOR DIALYSIS PATIENTS.





B-TYPE NATRIURETIC FACTOR (BNP)2019 06:18:00* 





                Test Item       Value           Reference Range    Comments

 

                B-TYPE NATRIURETIC PEPTIDE (BEAKER) (test code=700)    417 pg/mL       0-100            





POCT-GLUCOSE [id number] 21:00:00* 





                Test Item       Value           Reference Range    Comments

 

                POC-GLUCOSE METER (BEAKER) (test code=1538)    145 mg/dL                 TESTED AT [address] [address]





POCT-GLUCOSE [id number] 17:34:00* 





                Test Item       Value           Reference Range    Comments

 

                POC-GLUCOSE METER (BEAKER) (test code=1538)    217 mg/dL                 TESTED AT [address] [address]





POCT-GLUCOSE [id number] 16:43:00* 





                Test Item       Value           Reference Range    Comments

 

                POC-GLUCOSE METER (BEAKER) (test code=1538)    104 mg/dL                 TESTED AT [address] [address]





POCT-GLUCOSE [id number] 08:46:00* 





                Test Item       Value           Reference Range    Comments

 

                POC-GLUCOSE METER (BEAKER) (test code=1538)    246 mg/dL                 TESTED AT [facility] 

[address] [facility] [address]





POCT-GLUCOSE [id number] 21:13:00* 





                Test Item       Value           Reference Range    Comments

 

                POC-GLUCOSE METER (BEAKER) (test code=1538)    236 mg/dL                 TESTED AT [place] [id number] [facility] [address]





POCT-GLUCOSE [id number] 16:58:00* 





                Test Item       Value           Reference Range    Comments

 

                POC-GLUCOSE METER (BEAKER) (test code=1538)    123 mg/dL                 TESTED AT [address] [address]





POCT-GLUCOSE [id number] 07:51:00* 





                Test Item       Value           Reference Range    Comments

 

                POC-GLUCOSE METER (BEAKER) (test code=1538)    94 mg/dL                  TESTED AT [place] [id number]

 [facility] [address]





BASIC METABOLIC [id number] 06:06:00* 





                Test Item       Value           Reference Range    Comments

 

                SODIUM (BEAKER) (test code=381)    135 meq/L       136-145          

 

                POTASSIUM (BEAKER) (test code=379)    4.0 meq/L       3.5-5.1          

 

                CHLORIDE (BEAKER) (test code=382)    108 meq/L                  

 

                CO2 (BEAKER) (test code=355)    20 meq/L        22-29            

 

                BLOOD UREA NITROGEN (BEAKER) (test code=354)    15 mg/dL        7-21             

 

                CREATININE (BEAKER) (test code=358)    0.85 mg/dL      0.57-1.25        

 

                GLUCOSE RANDOM (BEAKER) (test code=652)    107 mg/dL                  

 

                CALCIUM (BEAKER) (test code=697)    8.7 mg/dL       8.4-10.2         

 

                EGFR (BEAKER) (test code=1092)    67 mL/min/1.73 sq m                    ESTIMATED GFR IS NOT AS 

ACCURATE AS CREATININE CLEARANCE IN PREDICTING GLOMERULAR FILTRATION RATE. 
ESTIMATED GFR IS NOT APPLICABLE FOR DIALYSIS PATIENTS.





CBC (HEMOGRAM ONLY)2019 05:25:00* 





                Test Item       Value           Reference Range    Comments

 

                WHITE BLOOD CELL COUNT (BEAKER) (test code=775)    5.9 K/ L        3.5-10.5         

 

                RED BLOOD CELL COUNT (BEAKER) (test code=761)    3.65 M/ L       3.93-5.22        

 

                HEMOGLOBIN (BEAKER) (test code=410)    12.3 GM/DL      11.2-15.7        

 

                HEMATOCRIT (BEAKER) (test code=411)    37.3 %          34.1-44.9        

 

                MEAN CORPUSCULAR VOLUME (BEAKER) (test code=753)    102.2 fL        79.4-94.8        

 

                MEAN CORPUSCULAR HEMOGLOBIN (BEAKER) (test code=751)    33.7 pg         25.6-32.2        

 

                    MEAN CORPUSCULAR HEMOGLOBIN CONC (BEAKER) (test code=752)    33.0 GM/DL          32.2-35.5

                                         

 

                RED CELL DISTRIBUTION WIDTH (BEAKER) (test code=412)    13.9 %          11.7-14.4        

 

                PLATELET COUNT (BEAKER) (test code=756)    147 K/CU MM     150-450          

 

                MEAN PLATELET VOLUME (BEAKER) (test code=754)    10.0 fL         9.4-12.3         

 

                NUCLEATED RED BLOOD CELLS (BEAKER) (test code=413)    0 /100 WBC      0-0              





POCT-GLUCOSE [id number] 21:33:00* 





                Test Item       Value           Reference Range    Comments

 

                POC-GLUCOSE METER (BEAKER) (test code=1538)    134 mg/dL                 TESTED AT [address] [address]





CT, CHEST WITH IV CONTRAST- PE TEST [id number] 11:31:00Reason for exam:->
cp/sobWhat is the patient's sedation requirement?->No SedationFINAL REPORT 
PATIENT ID:   [id number] Chest CT with contrast, PE protocol INDICATION: Shortness
of breathChest pain, acute, PE suspected, high pretest probcp/sob COMPARISON: 
2017 and 2017 TECHNIQUE: CT examination of the chest was performed 
after the administration of intravenous contrast per pulmonary embolism 
protocol. 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
med. This exam was performed according to our departmental dose optimization pro
gram which includes automated exposure control, adjustment of the mA and/or kV a
ccording to patient size and/or use of iterative reconstructive technique. FINDI
NGS: The contrast bolus was adequate. There are filling defects in the right upp
er lobe pulmonary artery and its branches, consistent with pulmonary emboli. The
re is occlusive segmental and subsegmental in the lingular pulmonary artery as w
ell. There is no pneumothorax, pulmonary edema or pleural effusion. There is a n
ew pulmonary nodule in the superior segment of the left lower lobe measuring 1.4
cm, image 21, suspicious for malignancy. There is scarring and nodularity in the
lingula with volume loss and round opacities measuring up to 1.8 cm and 1.9 cm 
suggestive of infarcts with scarring and rounded atelectasis, amenable to follo
w-up examination. There is interval increase in nodular opacity in the left lung
apex measuring 2.5 cm which can be correlated with history of prior biopsy. The 
major airways are clear. The visualized portion of the thyroid gland appear unr
emarkable. There is no hilar or axillary lymphadenopathy. Nonspecific mildly pro
minent mediastinal lymph nodes are again seen measuring up to 1.1 x 1.4 cm in th
e pretracheal region. There is no pericardial effusion. Limited visualization of
the upper abdominal structures appear unremarkable. The osseous structures demo
nstrate degenerative changes. IMPRESSION:1. Pulmonary emboli in the lingular and
right upper lobe pulmonary arteries as above.2. New 1.4 cm nodule in the superi
or segment of the left lower lobe, suspicious for malignancy. Interval increase 
in nodular opacity in the left lung apex.3. Round opacities in the lingula sugge
stive of pulmonary infarcts with scarring and rounded atelectasis, amenable to f
ollow-up examination.4. Nonspecific mildly prominent mediastinal lymph nodes as 
before. These findings were relayed to Dr. Chaney at 11:30 AM on 2019. Marcelina
d: Mateo Martinez MDReport Verified Date/Time:  2019 11:31:48 Reading Location
: [facility] B1 C013X [facility] Consult Reading Room       Electronically signed by: MATEO MARTINEZ M.D. on 2019 11:31 AM PT/[id number] 11:30:00* 





                Test Item       Value           Reference Range    Comments

 

                PROTIME (BEAKER) (test code=759)    13.1 seconds    11.7-14.7        

 

                INR (BEAKER) (test code=370)    1.0             <=5.9            

 

                PARTIAL THROMBOPLASTIN TIME (BEAKER) (test code=760)    22.1 seconds    22.5-36.0        







RECOMMENDED COUMADIN/WARFARIN INR THERAPY RANGESSTANDARD DOSE: 2.0 - 3.0   Inclu
linda: PROPHYLAXIS for venous thrombosis, systemic embolization; TREATMENT for francisco
ous thrombosis and/or pulmonary embolus.HIGH RISK: Target INR is 2.5-3.5 for pat
ients with mechanical heart valves.RAPID TROPONIN -81-27 10:45:00* 





                Test Item       Value           Reference Range    Comments

 

                RAPID TROPONIN I (BEAKER) (test code=1483)    < ng/mL         <0.05            





B-TYPE NATRIURETIC FACTOR (BNP)2019 10:44:00* 





                Test Item       Value           Reference Range    Comments

 

                B-TYPE NATRIURETIC PEPTIDE (BEAKER) (test code=700)    150 pg/mL       0-100            





[id number] 10:40:00* 





                Test Item       Value           Reference Range    Comments

 

                D-DIMER QUANTITATIVE (BEAKER) (test code=671)    2.31 MG/L FEU    <0.50            





REGARDING D-DIMER RESULTS: Results of this D-Dimer test should always be interpr
eted in conjunction with the patient's medical history, clinical presentation an
d other findings. DVT clinical diagnosis should not be based on the results of I
NNOVANCE D-Dimer alone.[id number] 10:37:00* 





                Test Item       Value           Reference Range    Comments

 

                MAGNESIUM (BEAKER) (test code=627)    1.9 mg/dL       1.5-3.0          





BASIC METABOLIC [id number] 10:37:00* 





                Test Item       Value           Reference Range    Comments

 

                SODIUM (BEAKER) (test code=381)    141 meq/L       135-148          

 

                POTASSIUM (BEAKER) (test code=379)    4.4 meq/L       3.6-5.5          

 

                CHLORIDE (BEAKER) (test code=382)    108 meq/L                  

 

                CO2 (BEAKER) (test code=355)    27 meq/L        24-32            

 

                BLOOD UREA NITROGEN (BEAKER) (test code=354)    13 mg/dL        10-26            

 

                CREATININE (BEAKER) (test code=358)    1.08 mg/dL      0.50-1.20        

 

                GLUCOSE RANDOM (BEAKER) (test code=652)    149 mg/dL                  

 

                CALCIUM (BEAKER) (test code=697)    8.7 mg/dL       8.5-10.5         

 

                EGFR (BEAKER) (test code=1092)    51 mL/min/1.73 sq m                    ESTIMATED GFR IS NOT AS 

ACCURATE AS CREATININE CLEARANCE IN PREDICTING GLOMERULAR FILTRATION RATE. 
ESTIMATED GFR IS NOT APPLICABLE FOR DIALYSIS PATIENTS.





CBC W/PLT COUNT & AUTO [id number] 10:27:00* 





                Test Item       Value           Reference Range    Comments

 

                WHITE BLOOD CELL COUNT (BEAKER) (test code=775)    6.3 K/ L        4.0-10.0         

 

                RED BLOOD CELL COUNT (BEAKER) (test code=761)    4.12 M/ L       4.00-5.00        

 

                HEMOGLOBIN (BEAKER) (test code=410)    14.1 GM/DL      12.0-15.0        

 

                HEMATOCRIT (BEAKER) (test code=411)    41.8 %          36.0-45.0        

 

                MEAN CORPUSCULAR VOLUME (BEAKER) (test code=753)    101.3 fL        82.0-99.0        

 

                MEAN CORPUSCULAR HEMOGLOBIN (BEAKER) (test code=751)    34.1 pg         27.0-33.0        

 

                    MEAN CORPUSCULAR HEMOGLOBIN CONC (BEAKER) (test code=752)    33.6 GM/DL          32.0-36.0

                                         

 

                RED CELL DISTRIBUTION WIDTH (BEAKER) (test code=412)    13.4 %          10.3-14.2        

 

                PLATELET COUNT (BEAKER) (test code=756)    174 K/CU MM     150-430          

 

                MEAN PLATELET VOLUME (BEAKER) (test code=754)    8.1 fL          6.5-10.5         

 

                NEUTROPHILS RELATIVE PERCENT (BEAKER) (test code=429)    78 %                             

 

                LYMPHOCYTES RELATIVE PERCENT (BEAKER) (test code=430)    12 %                             

 

                MONOCYTES RELATIVE PERCENT (BEAKER) (test code=431)    8 %                              

 

                EOSINOPHILS RELATIVE PERCENT (BEAKER) (test code=432)    2 %                              

 

                BASOPHILS RELATIVE PERCENT (BEAKER) (test code=437)    1 %                              

 

                NEUTROPHILS ABSOLUTE COUNT (BEAKER) (test code=670)    4.87 K/ L       1.80-8.00        

 

                LYMPHOCYTES ABSOLUTE COUNT (BEAKER) (test code=414)    0.74 K/ L       1.48-4.50        

 

                MONOCYTES ABSOLUTE COUNT (BEAKER) (test code=415)    0.48 K/ L       0.00-1.30        

 

                EOSINOPHILS ABSOLUTE COUNT (BEAKER) (test code=416)    0.12 K/ L       0.00-0.50        

 

                BASOPHILS ABSOLUTE COUNT (BEAKER) (test code=417)    0.06 K/ L       0.00-0.20        





AFB CULTURE + [id number] 12:39:00* 





                Test Item       Value           Reference Range    Comments

 

                CULTURE (BEAKER) (test code=1095)    No acid-fast bacilli isolated in 42 days                    

 

 

                AFB SMEAR (BEAKER) (test code=994)    No acid fast bacilli seen                     





FUNGUS CULTURE +  [id number] 09:47:00* 





                Test Item       Value           Reference Range    Comments

 

                CULTURE (BEAKER) (test code=1095)    No fungus isolated in 28 days                     

 

                FUNGUS SMEAR (BEAKER) (test code=1406)    No fungi seen                     





FINE NEEDLE ASPIRATION BY [id number] 11:08:00Medical Cytology Report   
                       Case: [id number]                                 
Authorizing Provider:  Medardo Willingham MD         Collected:           
2017 1702            Ordering Location:     [facility] ENDOSCOPY [facility]    
Received:            2017 1056            Pathologist:           Rocío Serna MD                                                                      
  Specimen:    Lymph Node, Lower Paratracheal, Left, Station 4L, 4 sets rapids 
in CRR               LYMPH NODE, LOWER PARATRACHEAL, LEFT, STATION 4L FNA (EBUS)
BY CLINICIAN (MARIAM) (CYTOSPINS, CELL BLOCK):   -  ADEQUATE SAMPLE   -  
NEGATIVE FOR EPITHELIAL MALIGNANCY   -  CONSISTENT WITH REACTIVE LYMPH CODEElec
tronically signed by Rocío Serna MD on 2017 at 11:08 AMPlease
see cases [id number] through [id number], [id number], [id number] diagnosis of adenocarcino
ma in left upper lobe nodule([id number]), lymphadenopathyLYMPH NODE, LOWER PARATRA
CHEAL, LEFT, STATION 4L EBUS15 mls in cytorich red; 9 direct smear slides, cell 
blockCollected: [id number]: [id number] PRESENT (5:10PM, [place])[facility], Department of Pathology, [address] [place] [address], Tel [phone] Memorial Hospital, Department of Patholo
gy, [address] [place] [address], Tel [phone] NEEDLE 
ASPIRATION BY [id number] 16:56:00Medical Cytology Report               
           Case: [id number]                                 Authorizing Provider:
 Medardo Willingham MD         Collected:           2017 1704            
Ordering Location:     [facility] ENDOSCOPY [facility]    Received:            
2017 1056            Pathologist:           Rocío Serna MD           
                                                             Specimen:    Lymph 
Node, Subcarinal, Station 7, 2 sets slides in CRR                              
LYMPH NODE, SUBCARINAL, STATION 7 EBUS FNA BY CLINICIAN (CYTOSPINS AND CELL 
BLOCK OF ASPIRATE):  -    ADEQUATE SAMPLE   -  NEGATIVE FOR EPITHELIAL 
MALIGNANCYElectronically signed by Rocío Serna MD on 2017 at 
4:56 PMPlease see cases [id number] through [id number], [id number] diagnosis of 
adenocarcinoma in left upper lobe nodule([id number]), lymphadenopathyLYMPH NODE, 
SUBCARINAL, STATION 7 EBUS10 mls in cytorich red; 6 direct smear slides, cell 
blockCollected: [id number]: [id number] Luther King Jr. - Harbor [facility], D
epartment of Pathology, [address] [place] [address], Tel [phone]
[facility], Department of Pathology, [address] [place] [address], Tel [phone] NEEDLE ASPIRATION BY [id number]
16:29:00Medical Cytology Report                           Case: [id number]       
                         Authorizing Provider:  Medardo Willingham MD         
Collected:           2017 1615            Ordering Location:     [facility] 
ENDOSCOPY SERVICES    Received:            2017 1056            
Pathologist:           Rocío Serna MD                                      
                                  Specimen:    Lymph Node, Lower Paratracheal, 
Right, Station 4R, 3 sets rapids in CRR              LYMPH NODE, LOWER 
PARATRACHEAL, RIGHT, STATION 4R EBUS (CYTOSPINS AND CELL BLOCK):   -  NEGATIVE 
FOR EPITHELIAL MALIGNANCY   -  POLYMORPHOUS LYMPHOCYTES WITH TINGIBLE BODY 
MACROPHAGES, FAVOR REACTIVEElectronically signed by Rocío Serna MD
on 2017 at  4:29 PMPlease see cases [id number] through [id number], [id number], 
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 
lymphadenopathyLYMPH NODE, LOWER PARATRACHEAL, RIGHT, STATION 4R EBUS12 mls in 
cytorich red; 9 direct smear slides, cell blockCollected: [id number]: 
[id number] PRESENT (4:47PM, [place])[facility], 
Department of Pathology, [address] [place] [address], Tel 
[phone], [facility] of [facility], [address] [place] [address], Tel [phone] CULTURE + GRAM 
STAIN2017-06-10 12:28:00* 





                Test Item       Value           Reference Range    Comments

 

                CULTURE (BEAKER) (test code=1095)    No growth                        

 

                GRAM STAIN RESULT (BEAKER) (test code=1123)    1+ WBCs                          

 

                GRAM STAIN RESULT (BEAKER) (test code=11238)    No organisms seen                     





SPIN/CONCENTRATION [id number] 13:44:00* 





                Test Item       Value           Reference Range    Comments

 

                CONCENTRATION CHARGED (BEAKER) (test code=2657)    Done                             





FINE NEEDLE ASPIRATE (FNA) [id number] 12:00:00* 





                Test Item       Value           Reference Range    Comments

 

                CYTOLOGY RESULT POINTER (BEAKER) (test code=2629)    See Separate Report                     





FINE NEEDLE ASPIRATE (FNA) [id number] 12:00:00* 





                Test Item       Value           Reference Range    Comments

 

                CYTOLOGY RESULT POINTER (BEAKER) (test code=2629)    See Separate Report                     





FINE NEEDLE ASPIRATE (FNA) [id number] 12:00:00* 





                Test Item       Value           Reference Range    Comments

 

                CYTOLOGY RESULT POINTER (BEAKER) (test code=2629)    See Separate Report                     





PROTHROMBIN TIME/[id number] 13:11:00* 





                Test Item       Value           Reference Range    Comments

 

                PROTIME (BEAKER) (test code=759)    13.3 seconds    11.7-14.7        

 

                INR (BEAKER) (test code=370)    1.0             <=5.9            





RECOMMENDED COUMADIN/WARFARIN INR THERAPY RANGESSTANDARD DOSE: 2.0 - 3.0   Inclu
linda: PROPHYLAXIS for venous thrombosis, systemic embolization; TREATMENT for francisco
ous thrombosis and/or pulmonary embolus.HIGH RISK: Target INR is 2.5-3.5 for pat
ients with mechanical heart valves.[id number] 12:50:00* 





                Test Item       Value           Reference Range    Comments

 

                SODIUM (BEAKER) (test code=381)    146 meq/L       136-145          

 

                POTASSIUM (BEAKER) (test code=379)    5.5 meq/L       3.5-5.1          

 

                CHLORIDE (BEAKER) (test code=382)    113 meq/L                  

 

                CO2 (BEAKER) (test code=355)    24 meq/L        22-29            





BUN AND [id number] 12:50:00* 





                Test Item       Value           Reference Range    Comments

 

                BLOOD UREA NITROGEN (BEAKER) (test code=354)    19 mg/dL        7-21             

 

                CREATININE (BEAKER) (test code=358)    1.15 mg/dL      0.57-1.25        

 

                EGFR (BEAKER) (test code=1092)    48 mL/min/1.73 sq m                    ESTIMATED GFR IS NOT AS 

ACCURATE AS CREATININE CLEARANCE IN PREDICTING GLOMERULAR FILTRATION RATE. 
ESTIMATED GFR IS NOT APPLICABLE FOR DIALYSIS PATIENTS.





[id number] 12:17:00* 





                Test Item       Value           Reference Range    Comments

 

                HEMOGLOBIN (BEAKER) (test code=410)    14.6 GM/DL      12.0-15.0        





BASIC METABOLIC [id number] 06:27:00* 





                Test Item       Value           Reference Range    Comments

 

                SODIUM (BEAKER) (test code=381)    140 meq/L       136-145          

 

                POTASSIUM (BEAKER) (test code=379)    4.4 meq/L       3.5-5.1         Specimen slightly hemolyzed



 

                CHLORIDE (BEAKER) (test code=382)    111 meq/L                  

 

                CO2 (BEAKER) (test code=355)    22 meq/L        22-29            

 

                BLOOD UREA NITROGEN (BEAKER) (test code=354)    10 mg/dL        7-21             

 

                CREATININE (BEAKER) (test code=358)    0.84 mg/dL      0.57-1.25       Specimen slightly hemolyzed



 

                GLUCOSE RANDOM (BEAKER) (test code=652)    83 mg/dL                   

 

                CALCIUM (BEAKER) (test code=697)    8.4 mg/dL       8.4-10.2         

 

                EGFR (BEAKER) (test code=1092)    68 mL/min/1.73 sq m                    ESTIMATED GFR IS NOT AS 

ACCURATE AS CREATININE CLEARANCE IN PREDICTING GLOMERULAR FILTRATION RATE. 
ESTIMATED GFR IS NOT APPLICABLE FOR DIALYSIS PATIENTS.





TISSUE [id number] 08:50:00Surgical Pathology Report                         
Case: [id number]                                 Authorizing Provider:  
Kain Wild MD        Collected:           2017 1410            
Ordering Location:     [facility] Radiology Cat Scan   Received:            
2017 8209            Pathologist:           Pascual Colby MD    
                                            Specimen:    Lung, Left             
                                                             PART A LEFT LUNG 
BIOPSY:WELL DIFFERENTIATED ADENOCARCINOMA.SEE DIAGNOSTIC COMMENT.Electronically 
signed by Pascual Colby MD on 2017 at  8:50 AMThe tumor cells 
demonstrate a well differentiated morphology. Immunohistochemical studies 
performed on block A1 demonstrate the tumor cells to be positive for Napsin A 
and CK7. They are negative for CK7, PAX-8, TTF-1, carlos-3, CK20, synaptophysin, 
and ER. A reliable site of origin cannot be determined with the demonstrated 
immunoprofile. This case has been concurrently reviewed with Dr. Roman High, who
concurs with a diagnosis of well differentiated adenocarcinoma.12840, [id number], 
[id number] noduleCT-guided FNA lung biopsy, left upper lobeThe specimen is 
received in a formalin-filled container and labeled with the patient's inf
ormation and labeled "CT-guided FNA lung biopsy left upper lobe" and consists of
three tan-red core biopsies ranging from 0.5 to 0.6 cm, submitted entirely A1. 
CG/plThe following special studies were performed on this case and the interpret
ation is incorporated in the diagnostic report above:BLOCK A1- CK7, CK20, TTF-1,
NAPSIN A, PAX-8, GATA3, SYNAPTOPHYSIN, ERThe immunohistochemistry test was gabbi alvarez and its performance characteristics determined by [facility], Pathology Laboratory. It has not been cleared or approved by the U.S. Food
and Drug Administration. The FDA has determined that such clearance or approval 
is not necessary. The test is used for clinical purposes. It should not be rega
rded as investigational or for research. This laboratory is certified under the 
Clinical Laboratory Improvement Amendments of 1988 (CLIA-88) as qualified to per
form high complexity clinical laboratory testing.BASIC METABOLIC [id number]
02:16:00* 





                Test Item       Value           Reference Range    Comments

 

                SODIUM (BEAKER) (test code=381)    140 meq/L       136-145          

 

                POTASSIUM (BEAKER) (test code=379)    4.1 meq/L       3.5-5.1         Specimen slightly hemolyzed



 

                CHLORIDE (BEAKER) (test code=382)    107 meq/L                  

 

                CO2 (BEAKER) (test code=355)    24 meq/L        22-29            

 

                BLOOD UREA NITROGEN (BEAKER) (test code=354)    14 mg/dL        7-21             

 

                CREATININE (BEAKER) (test code=358)    1.09 mg/dL      0.57-1.25       Specimen slightly hemolyzed



 

                GLUCOSE RANDOM (BEAKER) (test code=652)    104 mg/dL                  

 

                CALCIUM (BEAKER) (test code=697)    8.8 mg/dL       8.4-10.2         

 

                EGFR (BEAKER) (test code=1092)    51 mL/min/1.73 sq m                    ESTIMATED GFR IS NOT AS 

ACCURATE AS CREATININE CLEARANCE IN PREDICTING GLOMERULAR FILTRATION RATE. 
ESTIMATED GFR IS NOT APPLICABLE FOR DIALYSIS PATIENTS.





PHENYTOIN LEVEL, [id number] 18:09:00* 





                Test Item       Value           Reference Range    Comments

 

                PHENYTOIN (DILANTIN) (BEAKER) (test code=605)    10.0 ug/mL      10.0-20.0        





BASIC METABOLIC [id number] 05:20:00* 





                Test Item       Value           Reference Range    Comments

 

                SODIUM (BEAKER) (test code=381)    140 meq/L       136-145          

 

                POTASSIUM (BEAKER) (test code=379)    4.5 meq/L       3.5-5.1          

 

                CHLORIDE (BEAKER) (test code=382)    112 meq/L                  

 

                CO2 (BEAKER) (test code=355)    21 meq/L        22-29            

 

                BLOOD UREA NITROGEN (BEAKER) (test code=354)    12 mg/dL        7-21             

 

                CREATININE (BEAKER) (test code=358)    0.92 mg/dL      0.57-1.25        

 

                GLUCOSE RANDOM (BEAKER) (test code=652)    100 mg/dL                  

 

                CALCIUM (BEAKER) (test code=697)    8.5 mg/dL       8.4-10.2         

 

                EGFR (BEAKER) (test code=1092)    62 mL/min/1.73 sq m                    ESTIMATED GFR IS NOT AS 

ACCURATE AS CREATININE CLEARANCE IN PREDICTING GLOMERULAR FILTRATION RATE. 
ESTIMATED GFR IS NOT APPLICABLE FOR DIALYSIS PATIENTS.





BASIC METABOLIC [id number] 04:08:00* 





                Test Item       Value           Reference Range    Comments

 

                SODIUM (BEAKER) (test code=381)    140 meq/L       136-145          

 

                POTASSIUM (BEAKER) (test code=379)    4.2 meq/L       3.5-5.1         Specimen slightly hemolyzed



 

                CHLORIDE (BEAKER) (test code=382)    111 meq/L                  

 

                CO2 (BEAKER) (test code=355)    20 meq/L        22-29            

 

                BLOOD UREA NITROGEN (BEAKER) (test code=354)    15 mg/dL        7-21             

 

                CREATININE (BEAKER) (test code=358)    0.84 mg/dL      0.57-1.25       Specimen slightly hemolyzed



 

                GLUCOSE RANDOM (BEAKER) (test code=652)    116 mg/dL                  

 

                CALCIUM (BEAKER) (test code=697)    8.7 mg/dL       8.4-10.2         

 

                EGFR (BEAKER) (test code=1092)    68 mL/min/1.73 sq m                    ESTIMATED GFR IS NOT AS 

ACCURATE AS CREATININE CLEARANCE IN PREDICTING GLOMERULAR FILTRATION RATE. 
ESTIMATED GFR IS NOT APPLICABLE FOR DIALYSIS PATIENTS.





CBC W/PLT COUNT & AUTO [id number] 04:03:00* 





                Test Item       Value           Reference Range    Comments

 

                WHITE BLOOD CELL COUNT (BEAKER) (test code=775)    5.6 K/ L        4.0-10.0         

 

                RED BLOOD CELL COUNT (BEAKER) (test code=761)    4.38 M/ L       4.00-5.00        

 

                HEMOGLOBIN (BEAKER) (test code=410)    14.7 GM/DL      12.0-15.0        

 

                HEMATOCRIT (BEAKER) (test code=411)    43.3 %          36.0-45.0        

 

                MEAN CORPUSCULAR VOLUME (BEAKER) (test code=753)    98.9 fL         82.0-99.0        

 

                MEAN CORPUSCULAR HEMOGLOBIN (BEAKER) (test code=751)    33.5 pg         27.0-33.0        

 

                    MEAN CORPUSCULAR HEMOGLOBIN CONC (BEAKER) (test code=752)    33.9 GM/DL          32.0-36.0

                                         

 

                RED CELL DISTRIBUTION WIDTH (BEAKER) (test code=412)    13.1 %          10.3-14.2        

 

                PLATELET COUNT (BEAKER) (test code=756)    156 K/CU MM     150-430          

 

                MEAN PLATELET VOLUME (BEAKER) (test code=754)    7.4 fL          6.5-10.5         

 

                NUCLEATED RED BLOOD CELLS (BEAKER) (test code=413)    0 /100 WBC      0-0              

 

                NEUTROPHILS RELATIVE PERCENT (BEAKER) (test code=429)    68 %                             

 

                LYMPHOCYTES RELATIVE PERCENT (BEAKER) (test code=430)    22 %                             

 

                MONOCYTES RELATIVE PERCENT (BEAKER) (test code=431)    6 %                              

 

                EOSINOPHILS RELATIVE PERCENT (BEAKER) (test code=432)    3 %                              

 

                BASOPHILS RELATIVE PERCENT (BEAKER) (test code=437)    1 %                              

 

                NEUTROPHILS ABSOLUTE COUNT (BEAKER) (test code=670)    3.78 K/ L       1.80-8.00        

 

                LYMPHOCYTES ABSOLUTE COUNT (BEAKER) (test code=414)    1.22 K/ L       1.48-4.50        

 

                MONOCYTES ABSOLUTE COUNT (BEAKER) (test code=415)    0.36 K/ L       0.00-1.30        

 

                EOSINOPHILS ABSOLUTE COUNT (BEAKER) (test code=416)    0.18 K/ L       0.00-0.50        

 

                BASOPHILS ABSOLUTE COUNT (BEAKER) (test code=417)    0.03 K/ L       0.00-0.20        





0.00PT/[id number] 10:41:00* 





                Test Item       Value           Reference Range    Comments

 

                PROTIME (BEAKER) (test code=759)    14.4 seconds    11.7-14.7        

 

                INR (BEAKER) (test code=370)    1.1             <=5.9            

 

                PARTIAL THROMBOPLASTIN TIME (BEAKER) (test code=760)    26.3 seconds    22.5-36.0        







RECOMMENDED COUMADIN/WARFARIN INR THERAPY RANGESSTANDARD DOSE: 2.0 - 3.0   Inclu
linda: PROPHYLAXIS for venous thrombosis, systemic embolization; TREATMENT for francisco
ous thrombosis and/or pulmonary embolus.HIGH RISK: Target INR is 2.5-3.5 for pat
ients with mechanical heart valves.PLATELET [id number] 10:29:00* 





                Test Item       Value           Reference Range    Comments

 

                PLATELET COUNT (BEAKER) (test code=756)    165 K/CU MM     150-430